# Patient Record
Sex: MALE | Race: BLACK OR AFRICAN AMERICAN | NOT HISPANIC OR LATINO | Employment: FULL TIME | URBAN - METROPOLITAN AREA
[De-identification: names, ages, dates, MRNs, and addresses within clinical notes are randomized per-mention and may not be internally consistent; named-entity substitution may affect disease eponyms.]

---

## 2020-11-10 ENCOUNTER — OFFICE VISIT (OUTPATIENT)
Dept: FAMILY MEDICINE CLINIC | Facility: CLINIC | Age: 60
End: 2020-11-10

## 2020-11-10 VITALS
BODY MASS INDEX: 42.75 KG/M2 | DIASTOLIC BLOOD PRESSURE: 90 MMHG | HEART RATE: 82 BPM | TEMPERATURE: 96.9 F | OXYGEN SATURATION: 97 % | HEIGHT: 67 IN | SYSTOLIC BLOOD PRESSURE: 172 MMHG | WEIGHT: 272.4 LBS

## 2020-11-10 DIAGNOSIS — I10 HYPERTENSION, ESSENTIAL: ICD-10-CM

## 2020-11-10 DIAGNOSIS — M51.9 LUMBAR DISC DISEASE: Primary | ICD-10-CM

## 2020-11-10 DIAGNOSIS — M1A.09X0 CHRONIC GOUT OF MULTIPLE SITES, UNSPECIFIED CAUSE: ICD-10-CM

## 2020-11-10 PROCEDURE — 99203 OFFICE O/P NEW LOW 30 MIN: CPT | Performed by: INTERNAL MEDICINE

## 2020-11-10 RX ORDER — HYDRALAZINE HYDROCHLORIDE 10 MG/1
TABLET, FILM COATED ORAL
COMMUNITY
Start: 2020-07-16 | End: 2020-11-10 | Stop reason: ALTCHOICE

## 2020-11-10 RX ORDER — HYDRALAZINE HYDROCHLORIDE 25 MG/1
25 TABLET, FILM COATED ORAL 3 TIMES DAILY
Qty: 90 TABLET | Refills: 0 | Status: SHIPPED | OUTPATIENT
Start: 2020-11-10 | End: 2021-02-15 | Stop reason: SDUPTHER

## 2020-11-10 NOTE — PROGRESS NOTES
Subjective   Neftali Rogers is a 59 y.o. male.  Patient with several issues for most this is chronic back pain does have static with this back hurts most of the time he is very restless when he tries to sleep at night also has hypertension hydralazine on his 2 mg 3 times a day 3 times a day was not sufficient he has been out for a few days of blood pressures up accordingly we will increase his hydralazine  Body mass index is 42.66 kg/m².  History of Present Illness     Back pain 30 yrs    sees pain management I am not sure or cannot tell from our conversation if he is ever truly seen neurosurgery or not he does have an MRI from 2019 which is probably several areas including a a significant disc I believe at L3-L4 should see a neurosurgeon get an updated view to see if they can offer anything lasting pain management is been released from their practice we will try to get him another pain management did offer him NSAIDs such as Zorvalex or Nalfon patient not want that declined to give him any controlled pain medicine at this point in time gout is been doing fairly well patient states he recently had lab work so we will not do updated lab work he does smoke encouraged him to wean down his smoking he is status and treatment for that did not want flu shot has not tried epidurals or radiofrequency lesioning I am in the setting Dr. Zapien who can offer both I do think he needs to please give another modality a trial I do not think patient is motivated do so but we will leave the referrals in case he has a change of mind.  Family history positive for cancer undefined kidney disease.    *P drug allergies penicillin causing swelling no surgical history.  Is a current smoker 1 pack every 3 days by his history.  No prior surgeries  Review of Systems   Musculoskeletal: Positive for back pain.   All other systems reviewed and are negative.      Objective   Vitals:    11/10/20 1239   BP: 172/90   Pulse: 82   Temp: 96.9 °F  (36.1 °C)   SpO2: 97%   Weight: 124 kg (272 lb 6.4 oz)     Physical Exam  Vitals signs and nursing note reviewed.   Constitutional:       Appearance: Normal appearance.   HENT:      Head: Normocephalic and atraumatic.   Eyes:      Conjunctiva/sclera: Conjunctivae normal.      Pupils: Pupils are equal, round, and reactive to light.   Cardiovascular:      Rate and Rhythm: Normal rate and regular rhythm.      Heart sounds: Normal heart sounds.   Pulmonary:      Effort: Pulmonary effort is normal.      Breath sounds: Normal breath sounds.   Abdominal:      General: Abdomen is flat. Bowel sounds are normal.      Palpations: Abdomen is soft.   Musculoskeletal:      Comments: Pain with palpation lower lumbar spine more lower lumbar area both midline slight to the left and right.  Significant decreased flexion getting 30 degrees forward, 5 degrees backwards, when degrees left right lateral flexion.  Does fairly well with twisting of torso somewhat restricted mobility but no significant pain with that   Skin:     General: Skin is warm and dry.   Neurological:      General: No focal deficit present.      Mental Status: He is alert.      Comments: Positive straight raise on the right at 20 degrees positive straight raise on the left at 10 degrees.         No results found for: INR    Procedures    Assessment/Plan   1.  Chronic lumbar pain plan referral to Dr. Thai bose group also referral to pain management Dr. Luis paula    2.  Hypertension uncontrolled plan hydralazine 25 g 3 times daily call blood pressure readings 2 weeks follow-up in 2 months time    3.  Gout relatively stable by history has recent labs from elsewhere we will not get updated lab work at this point in time  We did discuss NSAIDs for his back as well as prednisone Lidoderm patches patient simply wants pain medicine for that which I declined to do.  Referral for pain management should be and at this point.    Much of this encounter note is an  electronic transcription/translation of spoken language to printed text.  The electronic translation of spoken language may permit erroneous, or at times, nonsensical words or phrases to be inadvertently transcribed.  Although I have reviewed the note for such errors, some may still exist. If there are questions or for further clarification, please contact me.  There are no diagnoses linked to this encounter.

## 2020-11-12 ENCOUNTER — TELEPHONE (OUTPATIENT)
Dept: NEUROSURGERY | Facility: CLINIC | Age: 60
End: 2020-11-12

## 2020-11-12 NOTE — TELEPHONE ENCOUNTER
Tried to call patient to schedule him an new patient appointment with mid-level. Please transfer call to office if he calls back. Someone picked phone up and then hung up.

## 2021-02-15 ENCOUNTER — TELEPHONE (OUTPATIENT)
Dept: FAMILY MEDICINE CLINIC | Facility: CLINIC | Age: 61
End: 2021-02-15

## 2021-02-15 RX ORDER — HYDRALAZINE HYDROCHLORIDE 25 MG/1
25 TABLET, FILM COATED ORAL 3 TIMES DAILY
Qty: 90 TABLET | Refills: 0 | Status: SHIPPED | OUTPATIENT
Start: 2021-02-15 | End: 2021-05-26

## 2021-02-15 NOTE — TELEPHONE ENCOUNTER
Caller: Neftali Rogers Bucoda    Relationship: Self    Best call back number:[phone]    Medication needed:   Requested Prescriptions     Pending Prescriptions Disp Refills   • hydrALAZINE (APRESOLINE) 25 MG tablet 90 tablet 0     Sig: Take 1 tablet by mouth 3 (Three) Times a Day.       When do you need the refill by: 2-15-21    What details did the patient provide when requesting the medication: PATIENT IS OUT OF MEDICATION    Does the patient have less than a 3 day supply:  [x] Yes  [] No    What is the patient's preferred pharmacy: [facility] [address] & ([facility])  [phone] [facility] [phone] FX

## 2021-02-16 ENCOUNTER — TELEPHONE (OUTPATIENT)
Dept: FAMILY MEDICINE CLINIC | Facility: CLINIC | Age: 61
End: 2021-02-16

## 2021-02-16 NOTE — TELEPHONE ENCOUNTER
Have not seen patient before.  There is no problem list.  Patient should stick with his present pain management group.  Very difficult to get in pain management.  We will not write his pain medicine

## 2021-02-16 NOTE — TELEPHONE ENCOUNTER
PT LEFT MESSAGE YESTERDAY FOR PAIN MGMT APPT, WAS REFERRED TO PAIN MGMT OFFICE BACK IN November BUT WANTS ANOTHER OFFICE, STATES THEY AREN'T GIVING HIM ENOUGH MEDICATION FOR HIS PAIN.  HASN'T BEEN SEEN SINCE 11/2020 BY [facility], Epic DOESN'T SHOW HIM SEEING ANYONE ELSE HERE BEFORE

## 2021-02-17 NOTE — TELEPHONE ENCOUNTER
Called office reasor never got refferal in November not taking new patients refaxed today [phone] for patient appt.and called him with number to recheck in a week.[phone]

## 2021-04-27 ENCOUNTER — IMMUNIZATION (OUTPATIENT)
Dept: VACCINE CLINIC | Facility: HOSPITAL | Age: 61
End: 2021-04-27

## 2021-04-27 PROCEDURE — 0001A: CPT | Performed by: INTERNAL MEDICINE

## 2021-04-27 PROCEDURE — 91300 HC SARSCOV02 VAC 30MCG/0.3ML IM: CPT | Performed by: INTERNAL MEDICINE

## 2021-05-18 ENCOUNTER — IMMUNIZATION (OUTPATIENT)
Dept: VACCINE CLINIC | Facility: HOSPITAL | Age: 61
End: 2021-05-18

## 2021-05-18 PROCEDURE — 91300 HC SARSCOV02 VAC 30MCG/0.3ML IM: CPT | Performed by: INTERNAL MEDICINE

## 2021-05-18 PROCEDURE — 0002A: CPT | Performed by: INTERNAL MEDICINE

## 2021-05-26 RX ORDER — HYDRALAZINE HYDROCHLORIDE 25 MG/1
TABLET, FILM COATED ORAL
Qty: 90 TABLET | Refills: 3 | Status: SHIPPED | OUTPATIENT
Start: 2021-05-26 | End: 2021-05-27 | Stop reason: SDUPTHER

## 2021-05-27 RX ORDER — HYDRALAZINE HYDROCHLORIDE 25 MG/1
25 TABLET, FILM COATED ORAL 3 TIMES DAILY
Qty: 30 TABLET | Refills: 3 | Status: SHIPPED | OUTPATIENT
Start: 2021-05-27 | End: 2021-08-11 | Stop reason: SDUPTHER

## 2021-06-02 ENCOUNTER — OFFICE VISIT (OUTPATIENT)
Dept: FAMILY MEDICINE CLINIC | Facility: CLINIC | Age: 61
End: 2021-06-02

## 2021-06-02 ENCOUNTER — TELEPHONE (OUTPATIENT)
Dept: FAMILY MEDICINE CLINIC | Facility: CLINIC | Age: 61
End: 2021-06-02

## 2021-06-02 VITALS
SYSTOLIC BLOOD PRESSURE: 174 MMHG | WEIGHT: 265.2 LBS | DIASTOLIC BLOOD PRESSURE: 108 MMHG | OXYGEN SATURATION: 98 % | HEIGHT: 67 IN | BODY MASS INDEX: 41.62 KG/M2 | HEART RATE: 78 BPM | TEMPERATURE: 97.8 F

## 2021-06-02 DIAGNOSIS — M54.40 BILATERAL LOW BACK PAIN WITH SCIATICA, SCIATICA LATERALITY UNSPECIFIED, UNSPECIFIED CHRONICITY: Primary | ICD-10-CM

## 2021-06-02 DIAGNOSIS — Z12.5 PROSTATE CANCER SCREENING: ICD-10-CM

## 2021-06-02 DIAGNOSIS — M10.00 IDIOPATHIC GOUT, UNSPECIFIED CHRONICITY, UNSPECIFIED SITE: ICD-10-CM

## 2021-06-02 DIAGNOSIS — I10 ESSENTIAL HYPERTENSION: ICD-10-CM

## 2021-06-02 PROCEDURE — 99214 OFFICE O/P EST MOD 30 MIN: CPT | Performed by: INTERNAL MEDICINE

## 2021-06-02 RX ORDER — VERAPAMIL HYDROCHLORIDE 240 MG/1
240 TABLET, FILM COATED, EXTENDED RELEASE ORAL NIGHTLY
Qty: 30 TABLET | Refills: 3 | Status: SHIPPED | OUTPATIENT
Start: 2021-06-02 | End: 2021-07-06 | Stop reason: SDUPTHER

## 2021-06-02 RX ORDER — HYDROCODONE BITARTRATE AND ACETAMINOPHEN 5; 325 MG/1; MG/1
1 TABLET ORAL EVERY 8 HOURS PRN
Qty: 90 TABLET | Refills: 0 | Status: SHIPPED | OUTPATIENT
Start: 2021-06-02 | End: 2021-07-06 | Stop reason: SDUPTHER

## 2021-06-02 RX ORDER — INDOMETHACIN 75 MG/1
75 CAPSULE, EXTENDED RELEASE ORAL
COMMUNITY
Start: 2021-01-06 | End: 2021-11-22 | Stop reason: SDUPTHER

## 2021-06-02 NOTE — PROGRESS NOTES
Subjective   Neftali Rogers is a 60 y.o. male.     Vitals:    06/02/21 1410   BP: (Abnormal) 174/108   Pulse: 78   Temp: 97.8 °F (36.6 °C)   SpO2: 98%      Body mass index is 41.54 kg/m².     History of Present Illness   Patient was seen for low back pain.  Pain is severe pain in his lower back and he was placed on Norco 5/325 3 times daily.  Patient was informed this is in 1 month only and would not be refilled.  Patient was scheduled for the pain clinic and neurosurgeon.  Patient's blood pressure was 170/80 in the office today.  Patient was taking hydralazine 25 mg daily.  Patient will monitor blood pressure and follow-up in 1 month.  Patient was also given verapamil 240 mg daily.  Patient's gout been stable over the past several months.  Uric acid level will be drawn.  Patient had labs drawn we will follow-up in 4 days.    Dictated utilizing Dragon dictation. If there are questions or for further clarification, please contact me.  The following portions of the patient's history were reviewed and updated as appropriate: allergies, current medications, past family history, past medical history, past social history, past surgical history and problem list.    Review of Systems   Constitutional: Negative for fatigue and fever.   HENT: Positive for congestion. Negative for trouble swallowing.    Eyes: Negative for discharge and visual disturbance.   Respiratory: Negative for choking and shortness of breath.    Cardiovascular: Negative for chest pain and palpitations.   Gastrointestinal: Negative for abdominal pain and blood in stool.   Endocrine: Negative.    Genitourinary: Negative for genital sores and hematuria.   Musculoskeletal: Positive for back pain, gait problem and joint swelling.   Skin: Negative for color change, pallor, rash and wound.   Allergic/Immunologic: Positive for environmental allergies. Negative for immunocompromised state.   Neurological: Negative for facial asymmetry and speech  difficulty.   Psychiatric/Behavioral: Negative for hallucinations and suicidal ideas.       Objective   Physical Exam  Vitals and nursing note reviewed.   Constitutional:       Appearance: Normal appearance. He is well-developed.   HENT:      Head: Normocephalic and atraumatic.      Nose: Nose normal.      Mouth/Throat:      Mouth: Mucous membranes are moist.      Pharynx: Oropharynx is clear.   Eyes:      Extraocular Movements: Extraocular movements intact.      Conjunctiva/sclera: Conjunctivae normal.      Pupils: Pupils are equal, round, and reactive to light.   Cardiovascular:      Rate and Rhythm: Normal rate and regular rhythm.      Heart sounds: Normal heart sounds.   Pulmonary:      Effort: Pulmonary effort is normal.      Breath sounds: Normal breath sounds.   Abdominal:      General: Bowel sounds are normal.      Palpations: Abdomen is soft.   Musculoskeletal:         General: Swelling and tenderness present. Normal range of motion.      Cervical back: Normal range of motion and neck supple.   Skin:     General: Skin is warm and dry.   Neurological:      General: No focal deficit present.      Mental Status: He is alert and oriented to person, place, and time. Mental status is at baseline.      Coordination: Coordination abnormal.      Gait: Gait abnormal.   Psychiatric:         Mood and Affect: Mood normal.         Behavior: Behavior normal.         Thought Content: Thought content normal.         Judgment: Judgment normal.         Assessment/Plan #1 Norco 5/325 3 times daily, 1 month only #2 refer to pain clinic, neurosurgery #3 monitor blood pressure and follow-up in 1 month #4 add verapamil 240 mg daily #5 labs  Problems Addressed this Visit     Cardiac and Vasculature            Hypertension    Relevant Medications    verapamil SR (CALAN-SR) 240 MG CR tablet    Other Relevant Orders    CBC (No Diff)    Comprehensive Metabolic Panel    Lipid Panel    Vitamin D 25 Hydroxy          Musculoskeletal and  Injuries            Gout    Relevant Orders    CBC (No Diff)    Comprehensive Metabolic Panel    Lipid Panel    Vitamin D 25 Hydroxy    Uric Acid    Back pain - Primary    Relevant Medications    HYDROcodone-acetaminophen (Norco) 5-325 MG per tablet    Other Relevant Orders    CBC (No Diff)    Comprehensive Metabolic Panel    Lipid Panel    Vitamin D 25 Hydroxy    Ambulatory Referral to Pain Management            Other Visit Diagnoses     Prostate cancer screening        Relevant Orders    PSA Screen      Diagnoses     Diagnosis Codes Comments    Bilateral low back pain with sciatica, sciatica laterality unspecified, unspecified chronicity    -  Primary ICD-10-CM: M54.40  ICD-9-CM: 724.3     Idiopathic gout, unspecified chronicity, unspecified site     ICD-10-CM: M10.00  ICD-9-CM: 274.9     Essential hypertension     ICD-10-CM: I10  ICD-9-CM: 401.9     Prostate cancer screening     ICD-10-CM: Z12.5  ICD-9-CM: V76.44

## 2021-06-02 NOTE — TELEPHONE ENCOUNTER
Patient has been awaiting since November for referrals [facility] put in neurosurgeon, and pain management he said no one ever called him and now he cant sleep and barely walk . He is seeing tori today    Can some one work on this now please

## 2021-07-06 ENCOUNTER — OFFICE VISIT (OUTPATIENT)
Dept: FAMILY MEDICINE CLINIC | Facility: CLINIC | Age: 61
End: 2021-07-06

## 2021-07-06 VITALS
TEMPERATURE: 98.4 F | HEART RATE: 79 BPM | OXYGEN SATURATION: 98 % | HEIGHT: 67 IN | BODY MASS INDEX: 40.4 KG/M2 | SYSTOLIC BLOOD PRESSURE: 172 MMHG | WEIGHT: 257.4 LBS | DIASTOLIC BLOOD PRESSURE: 82 MMHG

## 2021-07-06 DIAGNOSIS — G47.33 OBSTRUCTIVE SLEEP APNEA SYNDROME: ICD-10-CM

## 2021-07-06 DIAGNOSIS — I10 ESSENTIAL HYPERTENSION: Primary | ICD-10-CM

## 2021-07-06 DIAGNOSIS — M10.00 IDIOPATHIC GOUT, UNSPECIFIED CHRONICITY, UNSPECIFIED SITE: ICD-10-CM

## 2021-07-06 DIAGNOSIS — M54.40 BILATERAL LOW BACK PAIN WITH SCIATICA, SCIATICA LATERALITY UNSPECIFIED, UNSPECIFIED CHRONICITY: ICD-10-CM

## 2021-07-06 DIAGNOSIS — M54.40 LOW BACK PAIN WITH SCIATICA, SCIATICA LATERALITY UNSPECIFIED, UNSPECIFIED BACK PAIN LATERALITY, UNSPECIFIED CHRONICITY: ICD-10-CM

## 2021-07-06 PROCEDURE — 99214 OFFICE O/P EST MOD 30 MIN: CPT | Performed by: INTERNAL MEDICINE

## 2021-07-06 RX ORDER — VERAPAMIL HYDROCHLORIDE 240 MG/1
240 TABLET, FILM COATED, EXTENDED RELEASE ORAL NIGHTLY
Qty: 30 TABLET | Refills: 3 | Status: SHIPPED | OUTPATIENT
Start: 2021-07-06 | End: 2021-08-11 | Stop reason: SDUPTHER

## 2021-07-06 RX ORDER — HYDROCODONE BITARTRATE AND ACETAMINOPHEN 5; 325 MG/1; MG/1
1 TABLET ORAL EVERY 8 HOURS PRN
Qty: 90 TABLET | Refills: 0 | Status: SHIPPED | OUTPATIENT
Start: 2021-07-06 | End: 2021-08-10

## 2021-07-06 RX ORDER — CLONIDINE HYDROCHLORIDE 0.1 MG/1
0.1 TABLET ORAL 2 TIMES DAILY
Qty: 60 TABLET | Refills: 3 | Status: SHIPPED | OUTPATIENT
Start: 2021-07-06 | End: 2021-08-11 | Stop reason: SDUPTHER

## 2021-07-06 NOTE — PROGRESS NOTES
Subjective   Neftali Rogers is a 60 y.o. male.     Vitals:    07/06/21 1311   BP: 172/82   Pulse: 79   Temp: 98.4 °F (36.9 °C)   SpO2: 98%      Body mass index is 40.31 kg/m².     History of Present Illness   Patient was seen for hypertension.  Pressures been running 170s over 80s.  Patient is using hydralazine 25 mg twice daily with verapamil 240 mg daily.  Patient is having clonidine 0.1 mg twice daily added to regimen.  Patient does have sleep apnea but had a bad experience at the sleep center and will not go back.  Patient was informed it was harder to treat hypertension with untreated sleep apnea.  Patient was also informed he could affect his heart.  Patient does suffer from severe low back pain and wanted Norco.  Patient was informed he would have to go to pain clinic after this prescription.  This would be the last 1 regimen.  Patient is being referred to a pain clinic and also MRI of the lumbar spine.  Patient does have a history of gout and is using Indocin 75 mg twice daily as needed for gout.  Patient is to have labs drawn today and uric acid is pending.    Dictated utilizing Dragon dictation. If there are questions or for further clarification, please contact me.  The following portions of the patient's history were reviewed and updated as appropriate: allergies, current medications, past family history, past medical history, past social history, past surgical history and problem list.    Review of Systems   Constitutional: Negative for fatigue and fever.   HENT: Positive for congestion. Negative for trouble swallowing.    Eyes: Negative for discharge and visual disturbance.   Respiratory: Negative for choking and shortness of breath.    Cardiovascular: Negative for chest pain and palpitations.   Gastrointestinal: Negative for abdominal pain and blood in stool.   Endocrine: Negative.    Genitourinary: Negative for genital sores and hematuria.   Musculoskeletal: Positive for back pain. Negative  for gait problem and joint swelling.   Skin: Negative for color change, pallor, rash and wound.   Allergic/Immunologic: Positive for environmental allergies. Negative for immunocompromised state.   Neurological: Negative for facial asymmetry and speech difficulty.   Psychiatric/Behavioral: Negative for hallucinations and suicidal ideas.       Objective   Physical Exam  Vitals and nursing note reviewed.   Constitutional:       Appearance: Normal appearance. He is well-developed.   HENT:      Head: Normocephalic and atraumatic.      Nose: Nose normal.      Mouth/Throat:      Mouth: Mucous membranes are moist.      Pharynx: Oropharynx is clear.   Eyes:      Extraocular Movements: Extraocular movements intact.      Conjunctiva/sclera: Conjunctivae normal.      Pupils: Pupils are equal, round, and reactive to light.   Cardiovascular:      Rate and Rhythm: Normal rate and regular rhythm.      Heart sounds: Normal heart sounds.   Pulmonary:      Effort: Pulmonary effort is normal.      Breath sounds: Normal breath sounds.   Abdominal:      General: Bowel sounds are normal.      Palpations: Abdomen is soft.   Musculoskeletal:         General: Normal range of motion.      Cervical back: Normal range of motion and neck supple.   Skin:     General: Skin is warm and dry.   Neurological:      General: No focal deficit present.      Mental Status: He is alert and oriented to person, place, and time. Mental status is at baseline.   Psychiatric:         Mood and Affect: Mood normal.         Behavior: Behavior normal.         Thought Content: Thought content normal.         Judgment: Judgment normal.         Assessment/Plan #1 clonidine 0.1 mg twice daily, continue hydralazine with verapamil No. 2 monitor blood pressure and follow-up in 1 month #3 refer to pain clinic #4 MRI of the lumbar spine  Problems Addressed this Visit     Cardiac and Vasculature            Hypertension - Primary    Relevant Medications    verapamil SR  (CALAN-SR) 240 MG CR tablet    cloNIDine (Catapres) 0.1 MG tablet          Musculoskeletal and Injuries            Gout    Back pain    Relevant Medications    HYDROcodone-acetaminophen (Norco) 5-325 MG per tablet    Other Relevant Orders    Ambulatory Referral to Pain Management    MRI Lumbar Spine Without Contrast          Sleep            Obstructive sleep apnea syndrome            Diagnoses     Diagnosis Codes Comments    Essential hypertension    -  Primary ICD-10-CM: I10  ICD-9-CM: 401.9     Idiopathic gout, unspecified chronicity, unspecified site     ICD-10-CM: M10.00  ICD-9-CM: 274.9     Low back pain with sciatica, sciatica laterality unspecified, unspecified back pain laterality, unspecified chronicity     ICD-10-CM: M54.40  ICD-9-CM: 724.3     Bilateral low back pain with sciatica, sciatica laterality unspecified, unspecified chronicity     ICD-10-CM: M54.40  ICD-9-CM: 724.3     Obstructive sleep apnea syndrome     ICD-10-CM: G47.33  ICD-9-CM: 327.23

## 2021-08-02 ENCOUNTER — TELEPHONE (OUTPATIENT)
Dept: FAMILY MEDICINE CLINIC | Facility: CLINIC | Age: 61
End: 2021-08-02

## 2021-08-06 ENCOUNTER — TELEPHONE (OUTPATIENT)
Dept: FAMILY MEDICINE CLINIC | Facility: CLINIC | Age: 61
End: 2021-08-06

## 2021-08-06 NOTE — TELEPHONE ENCOUNTER
Dr. moulton from [facility] wanted to tell you he saw patient and he had no issuses and he his reviewing patient records and gave me his cell so you could discuss patient [phone] visit.          Patient had attempted to see several other pain managements and at one failed screening.

## 2021-08-08 NOTE — TELEPHONE ENCOUNTER
Patient needs to be informed we will not refill his hydrocodone. If he is shopping other doctors for pain medication will need to be fired.

## 2021-08-11 ENCOUNTER — OFFICE VISIT (OUTPATIENT)
Dept: FAMILY MEDICINE CLINIC | Facility: CLINIC | Age: 61
End: 2021-08-11

## 2021-08-11 VITALS
HEART RATE: 70 BPM | HEIGHT: 67 IN | BODY MASS INDEX: 40.18 KG/M2 | SYSTOLIC BLOOD PRESSURE: 122 MMHG | WEIGHT: 256 LBS | RESPIRATION RATE: 16 BRPM | OXYGEN SATURATION: 98 % | DIASTOLIC BLOOD PRESSURE: 80 MMHG | TEMPERATURE: 97.8 F

## 2021-08-11 DIAGNOSIS — M10.00 IDIOPATHIC GOUT, UNSPECIFIED CHRONICITY, UNSPECIFIED SITE: ICD-10-CM

## 2021-08-11 DIAGNOSIS — M54.40 CHRONIC LOW BACK PAIN WITH SCIATICA, SCIATICA LATERALITY UNSPECIFIED, UNSPECIFIED BACK PAIN LATERALITY: Primary | ICD-10-CM

## 2021-08-11 DIAGNOSIS — G89.29 CHRONIC LOW BACK PAIN WITH SCIATICA, SCIATICA LATERALITY UNSPECIFIED, UNSPECIFIED BACK PAIN LATERALITY: Primary | ICD-10-CM

## 2021-08-11 DIAGNOSIS — M48.061 SPINAL STENOSIS AT L4-L5 LEVEL: ICD-10-CM

## 2021-08-11 DIAGNOSIS — I10 ESSENTIAL HYPERTENSION: ICD-10-CM

## 2021-08-11 PROBLEM — M54.50 CHRONIC LOW BACK PAIN: Status: ACTIVE | Noted: 2021-08-11

## 2021-08-11 PROCEDURE — 99214 OFFICE O/P EST MOD 30 MIN: CPT | Performed by: INTERNAL MEDICINE

## 2021-08-11 RX ORDER — HYDRALAZINE HYDROCHLORIDE 25 MG/1
25 TABLET, FILM COATED ORAL 3 TIMES DAILY
Qty: 90 TABLET | Refills: 3 | Status: SHIPPED | OUTPATIENT
Start: 2021-08-11 | End: 2021-12-28 | Stop reason: SDUPTHER

## 2021-08-11 RX ORDER — HYDROCODONE BITARTRATE AND ACETAMINOPHEN 10; 325 MG/1; MG/1
1 TABLET ORAL EVERY 8 HOURS PRN
Qty: 20 TABLET | Refills: 0 | OUTPATIENT
Start: 2021-08-11 | End: 2023-01-18

## 2021-08-11 RX ORDER — VERAPAMIL HYDROCHLORIDE 240 MG/1
240 TABLET, FILM COATED, EXTENDED RELEASE ORAL NIGHTLY
Qty: 90 TABLET | Refills: 3 | Status: SHIPPED | OUTPATIENT
Start: 2021-08-11 | End: 2021-12-28 | Stop reason: SDUPTHER

## 2021-08-11 RX ORDER — CLONIDINE HYDROCHLORIDE 0.1 MG/1
0.1 TABLET ORAL 2 TIMES DAILY
Qty: 180 TABLET | Refills: 3 | Status: SHIPPED | OUTPATIENT
Start: 2021-08-11 | End: 2021-12-28 | Stop reason: SDUPTHER

## 2021-08-11 NOTE — PROGRESS NOTES
Subjective   Neftali Rogers is a 60 y.o. male.     Vitals:    08/11/21 1335   BP: 122/80   Pulse: 70   Resp: 16   Temp: 97.8 °F (36.6 °C)   SpO2: 98%      Body mass index is 40.09 kg/m².     History of Present Illness   Patient was seen for low back pain.  Patient had an MRI on him in hospital.  Patient showed severe arthritis along the lumbar spine and spinal stenosis at L4-5.  Patient also had radiculopathy down the L5 nerve root.  Patient was referred to a orthopedic spine specialist and a pain clinic.  Patient was given 20 hydrocodone 10/325 and told it would not be renewed.  Patient will follow up in 1 month.  Patient's blood pressures been running 180s patient will continue verapamil  mg daily, hydralazine 25 mg 3 times daily, clonidine 0.1 mg twice daily.  Patient also has a history of gout.  Patient is using a indomethacin 75 mg daily as needed.    Dictated utilizing Dragon dictation. If there are questions or for further clarification, please contact me.  The following portions of the patient's history were reviewed and updated as appropriate: allergies, current medications, past family history, past medical history, past social history, past surgical history and problem list.    Review of Systems   Constitutional: Negative for fatigue and fever.   HENT: Positive for congestion. Negative for trouble swallowing.    Eyes: Negative for discharge and visual disturbance.   Respiratory: Negative for choking and shortness of breath.    Cardiovascular: Negative for chest pain and palpitations.   Gastrointestinal: Negative for abdominal pain and blood in stool.   Endocrine: Negative.    Genitourinary: Negative for genital sores and hematuria.   Musculoskeletal: Positive for back pain and gait problem. Negative for joint swelling.   Skin: Negative for color change, pallor, rash and wound.   Allergic/Immunologic: Positive for environmental allergies. Negative for immunocompromised state.   Neurological:  Negative for facial asymmetry and speech difficulty.   Psychiatric/Behavioral: Negative for hallucinations and suicidal ideas.       Objective   Physical Exam  Vitals and nursing note reviewed.   Constitutional:       Appearance: Normal appearance. He is well-developed.   HENT:      Head: Normocephalic and atraumatic.      Nose: Nose normal.      Mouth/Throat:      Mouth: Mucous membranes are moist.      Pharynx: Oropharynx is clear.   Eyes:      Extraocular Movements: Extraocular movements intact.      Conjunctiva/sclera: Conjunctivae normal.      Pupils: Pupils are equal, round, and reactive to light.   Cardiovascular:      Rate and Rhythm: Normal rate and regular rhythm.      Heart sounds: Normal heart sounds. No murmur heard.   No friction rub. No gallop.    Pulmonary:      Effort: Pulmonary effort is normal. No respiratory distress.      Breath sounds: Normal breath sounds. No stridor. No wheezing, rhonchi or rales.   Chest:      Chest wall: No tenderness.   Abdominal:      General: Bowel sounds are normal.      Palpations: Abdomen is soft.   Musculoskeletal:         General: Swelling present. Normal range of motion.      Cervical back: Normal range of motion and neck supple.   Skin:     General: Skin is warm and dry.   Neurological:      General: No focal deficit present.      Mental Status: He is alert and oriented to person, place, and time. Mental status is at baseline.   Psychiatric:         Mood and Affect: Mood normal.         Behavior: Behavior normal.         Thought Content: Thought content normal.         Judgment: Judgment normal.         Assessment/Plan #1 refer to pain clinic to referred to orthopedic back surgeon #3 indomethacin SR 75 mg daily as needed for gout #4 continue to monitor blood pressure  Problems Addressed this Visit     Cardiac and Vasculature            Hypertension    Relevant Medications    cloNIDine (Catapres) 0.1 MG tablet    hydrALAZINE (APRESOLINE) 25 MG tablet    verapamil  SR (CALAN-SR) 240 MG CR tablet          Musculoskeletal and Injuries            Gout    Chronic low back pain - Primary            Other Visit Diagnoses     Spinal stenosis at L4-L5 level        Relevant Medications    HYDROcodone-acetaminophen (NORCO)  MG per tablet    Other Relevant Orders    Ambulatory Referral to Orthopedic Surgery    Ambulatory Referral to Pain Management    Back Brace      Diagnoses     Diagnosis Codes Comments    Chronic low back pain with sciatica, sciatica laterality unspecified, unspecified back pain laterality    -  Primary ICD-10-CM: M54.40, G89.29  ICD-9-CM: 724.2, 724.3, 338.29     Essential hypertension     ICD-10-CM: I10  ICD-9-CM: 401.9     Idiopathic gout, unspecified chronicity, unspecified site     ICD-10-CM: M10.00  ICD-9-CM: 274.9     Spinal stenosis at L4-L5 level     ICD-10-CM: M48.061  ICD-9-CM: 724.02

## 2021-09-01 NOTE — PROGRESS NOTES
Subjective   History of Present Illness: Neftali Rogers is a 60 y.o. male is being seen for consultation today at the request of Neftali Urena MD for constant back pain that radiates into the left leg with numbness and tingling that began approximately the year 2000 with an episode on a staircase.  He states that over the last 20 years the symptoms have continued to progress although she required fairly advanced evaluation and treatment in the past.  He denies bladder/bowel incontinence. He has tried oral pain medication, physical therapy, and chiropractic.  He says he has seen pain management in the past someone over on [facility] that change name several times and was recommended injections which he has refused in the past.  He is not interested in any surgical options either.  He was under the assumption that we were a pain management clinic.    While in the room and during my examination of the patient I wore a mask and eye protection.  I washed my hands before and after this patient encounter.  The patient was also wearing a mask.    Back Pain  The problem occurs constantly. The problem has been gradually worsening since onset. The pain is present in the lumbar spine. The quality of the pain is described as burning, aching, shooting, cramping and stabbing. The pain radiates to the left knee, left thigh and left foot. The pain is moderate. The symptoms are aggravated by position. Associated symptoms include numbness and tingling. Pertinent negatives include no bladder incontinence, bowel incontinence, chest pain or weakness. Treatments tried: oral pain medication.       The following portions of the patient's history were reviewed and updated as appropriate: allergies, current medications, past family history, past medical history, past social history, past surgical history and problem list.    Review of Systems   Constitutional: Positive for activity change.   HENT: Negative for congestion.   "  Eyes: Negative for visual disturbance.   Respiratory: Negative for chest tightness and shortness of breath.    Cardiovascular: Negative for chest pain.   Gastrointestinal: Negative for bowel incontinence and nausea.   Endocrine: Negative for cold intolerance and heat intolerance.   Genitourinary: Negative for bladder incontinence and difficulty urinating.   Musculoskeletal: Positive for back pain.   Skin: Negative for rash.   Allergic/Immunologic: Negative for environmental allergies.   Neurological: Positive for tingling and numbness. Negative for weakness.   Hematological: Does not bruise/bleed easily.   Psychiatric/Behavioral: Positive for sleep disturbance.       Objective     Vitals:    09/07/21 0947   BP: (!) 180/112   Temp: 97.8 °F (36.6 °C)   Weight: 116 kg (256 lb)   Height: 170.2 cm (67.01\")     Body mass index is 40.08 kg/m².      Physical Exam  Neurologic Exam    Physical Exam:    CONSTITUTIONAL: This 60 year old right handed Black male appears well developed, well-nourished and is sweating and uncomfortable.    HEAD & FACE: the head and face are symmetric, normocephalic and atraumatic.    EYES: Inspection of the conjunctivae and lids reveals no swelling, erythema or discharge.  Pupils are round, equal and reactive to light and there is no scleral icterus.    EARS, NOSE, MOUTH & THROAT: On inspection, the ears and nose are within normal limits.    NECK: the neck is supple and symmetric. The trachea is midline with no masses.    PULMONARY: Respiratory effort is normal with no increased work of breathing or signs of respiratory distress.    CARDIOVASCULAR: Pedal pulses are +2/4 bilaterally. Examination of the extremities shows no edema or varicosities.    LYMPHATIC: There is no palpable lymphadenopathy of the neck.    MUSCULOSKELETAL: Gait and station reveal a lumbago bent over posture. The spine is nontender to palpation but he has pain to palpation over the left SI joint    SKIN: The skin is warm, dry " and intact    NEUROLOGIC:   Cranial Nerves 2-12 intact  Normal motor strength noted. Muscle bulk and tone are normal.  Sensory exam is normal to fine touch to confrontational testing bilaterally  Reflexes on the right side demonstrates 1/4 Knee Jerk Reflex, 0/4 Ankle Jerk Reflex and no ankle clonus on the right.   Reflexes on the left side demonstrates 1/4 Knee Jerk Reflex, 0/4 Ankle Jerk Reflex and no ankle clonus on the left.  Superficial/Primitive Reflexes: primitive reflexes were absent.  Rich's, Babinski, and Clonus signs all negative.  No coordination deficit observed.  Radicular testing showed a negative Owen (ALEXY) test and positive straight leg raise.    Cortical function is intact and without deficits. Speech is normal.    PSYCHIATRIC: oriented to person, place and time. Patient's mood and affect are normal.    Assessment/Plan   Independent Review of Radiographic Studies:      I personally reviewed the images from the following studies.    MRI of the lumbar spine done at [facility] on August 5, 2021 reveals disc degeneration predominantly at L5-S1 which appears to be progressive since previous studies.  There is bony neuroforaminal stenosis at L5-S1.  At L4-5 there is advanced degenerative change with degenerative spondylolisthesis.  There is mild canal and bilateral lateral recess stenosis secondary to the deformity.  At L3-4 there is prominent facet arthropathy to the left but no neuroforaminal stenosis.    Medical Decision Making:      Patient has chronic pain in the low back to the left side into the proximal left leg.  He denies any weakness or numbness in the leg.  Clinical exam does not identify specific radiculopathy although he has radicular symptoms.  The findings on the MRI if he proceeds with an operative intervention it would require a reconstruction given the deformity which is outside of my clinical practice and I have deferred over the last several years to the spinal  subspecialist.  I offered referring him to an orthopedic spine specialist or neurosurgical spine subspecialist and he is not interested in pursuing surgery.  He thought he was being referred to me to be prescribed medication.  I explained that were surgical practice making surgical evaluations but I can refer him to an expert pain management specialist to offer them the nonoperative treatments.  He is also not inclined to proceed with interventional pain management either but I encouraged him to strongly consider it while he awaits that appointment.    This is a difficult situation as research suggests people with a BMI of greater than 40 have a higher than average rate of failed back surgery, and this patient's BMI is Body mass index is 40.08 kg/m².. With the limitations based on the BMI, surgery could only be offered in a situation where life or limb is threatened. We recommend an aggressive weight loss plan with the PCP.    Should he require a surgical opinion due to progression or failure of pain management or change his mind about the option of surgery he should see a spinal subspecialist such as an orthopedic spine doctor or a neurosurgical fellowship-trained subspecialist in spine.    No follow-ups on file.    Diagnoses and all orders for this visit:    1. Chronic left-sided low back pain with left-sided sciatica (Primary)  -     Ambulatory Referral to Pain Management    2. Spondylolisthesis of lumbar region  -     Ambulatory Referral to Pain Management    3. Morbid (severe) obesity due to excess calories (CMS/[facility])  -     Ambulatory Referral to Pain Management             Chava Rocha MD FACS FAANS  Neurological Surgery

## 2021-09-07 ENCOUNTER — OFFICE VISIT (OUTPATIENT)
Dept: NEUROSURGERY | Facility: CLINIC | Age: 61
End: 2021-09-07

## 2021-09-07 VITALS
WEIGHT: 256 LBS | BODY MASS INDEX: 40.18 KG/M2 | HEIGHT: 67 IN | DIASTOLIC BLOOD PRESSURE: 112 MMHG | SYSTOLIC BLOOD PRESSURE: 180 MMHG | TEMPERATURE: 97.8 F

## 2021-09-07 DIAGNOSIS — E66.01 MORBID (SEVERE) OBESITY DUE TO EXCESS CALORIES (HCC): ICD-10-CM

## 2021-09-07 DIAGNOSIS — M43.16 SPONDYLOLISTHESIS OF LUMBAR REGION: ICD-10-CM

## 2021-09-07 DIAGNOSIS — M54.42 CHRONIC LEFT-SIDED LOW BACK PAIN WITH LEFT-SIDED SCIATICA: Primary | ICD-10-CM

## 2021-09-07 DIAGNOSIS — G89.29 CHRONIC LEFT-SIDED LOW BACK PAIN WITH LEFT-SIDED SCIATICA: Primary | ICD-10-CM

## 2021-09-07 PROCEDURE — 99204 OFFICE O/P NEW MOD 45 MIN: CPT | Performed by: NEUROLOGICAL SURGERY

## 2021-09-09 ENCOUNTER — PATIENT ROUNDING (BHMG ONLY) (OUTPATIENT)
Dept: NEUROSURGERY | Facility: CLINIC | Age: 61
End: 2021-09-09

## 2021-09-09 NOTE — PROGRESS NOTES
September 9, 2021    Hello, may I speak with Neftali Rogers?    My name is Jackelyn Lilin, Clinical Coordinator       I am  with [facility] NEUROSURGERY [facility] NEUROSURGERY  [address]  [place] [address] [phone].    Before we get started may I verify your date of birth? 1960    I am calling to officially welcome you to our practice and ask about your recent visit. Is this a good time to talk? yes    Tell me about your visit with us. What things went well?  Patient stated that he was sent to see Dr. Rocha by his PCP and he didn't want a surgery.  Dr. Rocha stated that he needed surgery.  Patient stated that he was nice but just felt like the visit was a waste of his time because he made it very clear to his PCP that he did not want surgery.         We're always looking for ways to make our patients' experiences even better. Do you have recommendations on ways we may improve?  no    Overall were you satisfied with your first visit to our practice? Yes, just wish that his PCP would have just sent him to pain management instead of a neurosurgeon.  Patient did say that he is getting set up with pain management.        I appreciate you taking the time to speak with me today. Is there anything else I can do for you? no      Thank you, and have a great day.

## 2021-11-22 RX ORDER — INDOMETHACIN 75 MG/1
75 CAPSULE, EXTENDED RELEASE ORAL 2 TIMES DAILY WITH MEALS
Qty: 20 CAPSULE | Refills: 0 | Status: SHIPPED | OUTPATIENT
Start: 2021-11-22 | End: 2021-12-28 | Stop reason: SDUPTHER

## 2021-11-22 RX ORDER — INDOMETHACIN 75 MG/1
75 CAPSULE, EXTENDED RELEASE ORAL 2 TIMES DAILY WITH MEALS
Qty: 20 CAPSULE | Refills: 0 | Status: SHIPPED | OUTPATIENT
Start: 2021-11-22 | End: 2021-11-22 | Stop reason: SDUPTHER

## 2021-11-22 NOTE — TELEPHONE ENCOUNTER
Caller: Neftali Rogers Montchanin    Relationship: Self    Best call back number: [phone]     Requested Prescriptions:   Requested Prescriptions     Pending Prescriptions Disp Refills   • indomethacin SR (INDOCIN SR) 75 MG CR capsule 20 capsule 0     Sig: Take 1 capsule by mouth 2 (Two) Times a Day With Meals.        [address] where request should be sent[address] [facility] & ([address])  [phone] [facility] [phone] FX     Does the patient have less than a 3 day supply:  [x] Yes  [] No    Win Louise Rep   11/22/21 14:09 EST

## 2021-11-22 NOTE — TELEPHONE ENCOUNTER
Over due for labs, needs labs before next refill. Already ordered per RLS but needs to come in for lab appt.

## 2021-12-28 ENCOUNTER — TELEPHONE (OUTPATIENT)
Dept: FAMILY MEDICINE CLINIC | Facility: CLINIC | Age: 61
End: 2021-12-28

## 2021-12-28 RX ORDER — CLONIDINE HYDROCHLORIDE 0.1 MG/1
0.1 TABLET ORAL 2 TIMES DAILY
Qty: 180 TABLET | Refills: 3 | Status: SHIPPED | OUTPATIENT
Start: 2021-12-28 | End: 2022-03-28 | Stop reason: SDUPTHER

## 2021-12-28 RX ORDER — INDOMETHACIN 75 MG/1
75 CAPSULE, EXTENDED RELEASE ORAL 2 TIMES DAILY WITH MEALS
Qty: 20 CAPSULE | Refills: 1 | Status: SHIPPED | OUTPATIENT
Start: 2021-12-28 | End: 2022-03-28 | Stop reason: SDUPTHER

## 2021-12-28 RX ORDER — HYDRALAZINE HYDROCHLORIDE 25 MG/1
25 TABLET, FILM COATED ORAL 3 TIMES DAILY
Qty: 90 TABLET | Refills: 3 | Status: SHIPPED | OUTPATIENT
Start: 2021-12-28 | End: 2022-03-28 | Stop reason: SDUPTHER

## 2021-12-28 RX ORDER — VERAPAMIL HYDROCHLORIDE 240 MG/1
240 TABLET, FILM COATED, EXTENDED RELEASE ORAL NIGHTLY
Qty: 90 TABLET | Refills: 3 | Status: SHIPPED | OUTPATIENT
Start: 2021-12-28 | End: 2022-03-28 | Stop reason: SDUPTHER

## 2021-12-28 NOTE — TELEPHONE ENCOUNTER
PATIENT CALLED TO GET REFILLS OF BOTH BLOOD PRESSURE MEDICATIONS AND THE GOUT MEDICATION. DOESN'T KNOW THEIR NAMES. WANTED THE GOUT MEDICATION TO BE MORE THAN 20 SINCE TAKE IT 2 TIMES A DAY AND ONLY LAST FOR 10 DAYS    [facility] [address] AT [facility] & ([address]) - [phone]  - [phone] FX

## 2022-03-28 ENCOUNTER — OFFICE VISIT (OUTPATIENT)
Dept: FAMILY MEDICINE CLINIC | Facility: CLINIC | Age: 62
End: 2022-03-28

## 2022-03-28 VITALS
WEIGHT: 247.6 LBS | TEMPERATURE: 97.3 F | BODY MASS INDEX: 38.86 KG/M2 | OXYGEN SATURATION: 98 % | HEIGHT: 67 IN | HEART RATE: 76 BPM | SYSTOLIC BLOOD PRESSURE: 148 MMHG | DIASTOLIC BLOOD PRESSURE: 90 MMHG

## 2022-03-28 DIAGNOSIS — M10.00 IDIOPATHIC GOUT, UNSPECIFIED CHRONICITY, UNSPECIFIED SITE: ICD-10-CM

## 2022-03-28 DIAGNOSIS — M54.42 CHRONIC LEFT-SIDED LOW BACK PAIN WITH LEFT-SIDED SCIATICA: ICD-10-CM

## 2022-03-28 DIAGNOSIS — I10 PRIMARY HYPERTENSION: Primary | ICD-10-CM

## 2022-03-28 DIAGNOSIS — E66.01 MORBID (SEVERE) OBESITY DUE TO EXCESS CALORIES: ICD-10-CM

## 2022-03-28 DIAGNOSIS — J18.9 PNEUMONIA DUE TO INFECTIOUS ORGANISM, UNSPECIFIED LATERALITY, UNSPECIFIED PART OF LUNG: ICD-10-CM

## 2022-03-28 DIAGNOSIS — G89.29 CHRONIC LEFT-SIDED LOW BACK PAIN WITH LEFT-SIDED SCIATICA: ICD-10-CM

## 2022-03-28 DIAGNOSIS — G47.33 OBSTRUCTIVE SLEEP APNEA SYNDROME: ICD-10-CM

## 2022-03-28 PROCEDURE — 99214 OFFICE O/P EST MOD 30 MIN: CPT | Performed by: NURSE PRACTITIONER

## 2022-03-28 RX ORDER — INDOMETHACIN 75 MG/1
75 CAPSULE, EXTENDED RELEASE ORAL 2 TIMES DAILY WITH MEALS
Qty: 20 CAPSULE | Refills: 1 | Status: SHIPPED | OUTPATIENT
Start: 2022-03-28 | End: 2023-02-09 | Stop reason: SDUPTHER

## 2022-03-28 RX ORDER — METRONIDAZOLE 500 MG/1
500 TABLET ORAL 3 TIMES DAILY
COMMUNITY
Start: 2022-03-07 | End: 2022-04-02

## 2022-03-28 RX ORDER — HYDRALAZINE HYDROCHLORIDE 25 MG/1
25 TABLET, FILM COATED ORAL 3 TIMES DAILY
Qty: 90 TABLET | Refills: 3 | Status: SHIPPED | OUTPATIENT
Start: 2022-03-28 | End: 2023-02-09 | Stop reason: SDUPTHER

## 2022-03-28 RX ORDER — CLONIDINE HYDROCHLORIDE 0.1 MG/1
0.1 TABLET ORAL 2 TIMES DAILY
Qty: 180 TABLET | Refills: 3 | Status: SHIPPED | OUTPATIENT
Start: 2022-03-28

## 2022-03-28 RX ORDER — VERAPAMIL HYDROCHLORIDE 240 MG/1
240 TABLET, FILM COATED, EXTENDED RELEASE ORAL NIGHTLY
Qty: 90 TABLET | Refills: 3 | Status: SHIPPED | OUTPATIENT
Start: 2022-03-28

## 2022-03-28 RX ORDER — LEVOFLOXACIN 750 MG/1
750 TABLET ORAL DAILY
COMMUNITY
Start: 2022-03-07 | End: 2022-04-02

## 2022-03-28 NOTE — PROGRESS NOTES
"Chief Complaint  Hospital Follow Up Visit and Pneumonia    Subjective          Neftali Rogers presents to [facility]  Patient presents to the office for a hospital follow admission.3/5/2022/3/7/2022 Patient went to ER for dyspnea, increased cough and congestion. Patient was admitted for PNA. Patient is still taking antibiotics. Patient is to follow up with pulmonologist. Patient reports improvement with s/s   Patient has chronic pain and is requesting refill on Norco.       Objective   Vital Signs:   /90 (BP Location: Left arm, Patient Position: Sitting, Cuff Size: Large Adult)   Pulse 76   Temp 97.3 °F (36.3 °C) (Infrared)   Ht 170.2 cm (67.01\")   Wt 112 kg (247 lb 9.6 oz)   SpO2 98%   BMI 38.77 kg/m²     BMI is above normal parameters. Recommendations: educational material discussed/shared in after visit summary, exercise counseling/recommendations and nutrition counseling/recommendations       Physical Exam  Constitutional:       General: He is not in acute distress.     Appearance: Normal appearance.   HENT:      Head: Normocephalic.   Eyes:      Pupils: Pupils are equal, round, and reactive to light.   Cardiovascular:      Rate and Rhythm: Normal rate and regular rhythm.      Pulses: Normal pulses.      Heart sounds: Normal heart sounds.   Pulmonary:      Effort: Pulmonary effort is normal. No respiratory distress.      Breath sounds: Normal breath sounds. No wheezing or rales.   Abdominal:      General: Abdomen is flat.      Palpations: Abdomen is soft. There is no mass.      Tenderness: There is no abdominal tenderness.      Hernia: No hernia is present.   Musculoskeletal:         General: Tenderness present.      Cervical back: Normal. No spasms or tenderness. Normal range of motion.      Thoracic back: Normal. No spasms or tenderness. Normal range of motion.      Lumbar back: Spasms and tenderness present. No edema or bony tenderness. Decreased range of " motion. No scoliosis.   Neurological:      Mental Status: He is alert.   Psychiatric:         Mood and Affect: Mood normal.         Behavior: Behavior normal.        Result Review :   The following data was reviewed by: FERDINAND Guzmán on 03/28/2022:  Common labs    Common Labsle 3/6/22 3/7/22   WBC 14.14 (A) 10.94   Hemoglobin 13.6 14.1   Hematocrit 43.2 43.5   Platelets 264 270   (A) Abnormal value                     Assessment and Plan    Diagnoses and all orders for this visit:    1. Primary hypertension (Primary)  Assessment & Plan:  Hypertension is improving with treatment.  Continue current treatment regimen.  Dietary sodium restriction.  Weight loss.  Regular aerobic exercise.  Continue current medications.  Blood pressure will be reassessed at the next regular appointment.    Orders:  -     cloNIDine (Catapres) 0.1 MG tablet; Take 1 tablet by mouth 2 (Two) Times a Day.  Dispense: 180 tablet; Refill: 3  -     hydrALAZINE (APRESOLINE) 25 MG tablet; Take 1 tablet by mouth 3 (Three) Times a Day.  Dispense: 90 tablet; Refill: 3  -     verapamil SR (CALAN-SR) 240 MG CR tablet; Take 1 tablet by mouth Every Night.  Dispense: 90 tablet; Refill: 3  -     indomethacin SR (INDOCIN SR) 75 MG CR capsule; Take 1 capsule by mouth 2 (Two) Times a Day With Meals.  Dispense: 20 capsule; Refill: 1    2. Morbid (severe) obesity due to excess calories (HCC)  Assessment & Plan:  Patient's (Body mass index is 38.77 kg/m².) indicates that they are obese (BMI >30) with health conditions that include obstructive sleep apnea, hypertension and dyslipidemias . Weight is improving with treatment. BMI is is above average; BMI management plan is completed. We discussed portion control and increasing exercise.       3. Chronic left-sided low back pain with left-sided sciatica  Assessment & Plan:  Pt takes ibuprofen wants to start norco I advised pain management he refused at this time       4. Idiopathic gout, unspecified  chronicity, unspecified site  Assessment & Plan:  Stable takes indomethacin PRN       5. Obstructive sleep apnea syndrome  Assessment & Plan:  Pt to fu with pulmonary       6. Pneumonia due to infectious organism, unspecified laterality, unspecified part of lung  Assessment & Plan:  Taking levaquin/flagyl   Reports improvement         Follow Up   No follow-ups on file.  Patient was given instructions and counseling regarding his condition or for health maintenance advice. Please see specific information pulled into the AVS if appropriate.

## 2022-03-28 NOTE — ASSESSMENT & PLAN NOTE
Patient's (Body mass index is 38.77 kg/m².) indicates that they are obese (BMI >30) with health conditions that include obstructive sleep apnea, hypertension and dyslipidemias . Weight is improving with treatment. BMI is is above average; BMI management plan is completed. We discussed portion control and increasing exercise.

## 2022-07-12 NOTE — TELEPHONE ENCOUNTER
Patient was informed #or [phone] said he was at work and couldn't call.  Told him would send to [employer] to reschedule   unk

## 2023-01-18 ENCOUNTER — TELEPHONE (OUTPATIENT)
Dept: FAMILY MEDICINE CLINIC | Facility: CLINIC | Age: 63
End: 2023-01-18
Payer: COMMERCIAL

## 2023-02-09 ENCOUNTER — DOCUMENTATION (OUTPATIENT)
Dept: FAMILY MEDICINE CLINIC | Facility: CLINIC | Age: 63
End: 2023-02-09

## 2023-02-09 ENCOUNTER — OFFICE VISIT (OUTPATIENT)
Dept: FAMILY MEDICINE CLINIC | Facility: CLINIC | Age: 63
End: 2023-02-09
Payer: COMMERCIAL

## 2023-02-09 VITALS
BODY MASS INDEX: 36.16 KG/M2 | HEART RATE: 88 BPM | HEIGHT: 67 IN | TEMPERATURE: 97.3 F | OXYGEN SATURATION: 97 % | WEIGHT: 230.4 LBS | DIASTOLIC BLOOD PRESSURE: 88 MMHG | SYSTOLIC BLOOD PRESSURE: 158 MMHG

## 2023-02-09 DIAGNOSIS — Z12.5 PROSTATE CANCER SCREENING: ICD-10-CM

## 2023-02-09 DIAGNOSIS — M10.00 IDIOPATHIC GOUT, UNSPECIFIED CHRONICITY, UNSPECIFIED SITE: ICD-10-CM

## 2023-02-09 DIAGNOSIS — M54.42 CHRONIC LEFT-SIDED LOW BACK PAIN WITH LEFT-SIDED SCIATICA: Primary | ICD-10-CM

## 2023-02-09 DIAGNOSIS — I10 PRIMARY HYPERTENSION: ICD-10-CM

## 2023-02-09 DIAGNOSIS — G89.29 CHRONIC LEFT-SIDED LOW BACK PAIN WITH LEFT-SIDED SCIATICA: Primary | ICD-10-CM

## 2023-02-09 PROCEDURE — 99214 OFFICE O/P EST MOD 30 MIN: CPT | Performed by: INTERNAL MEDICINE

## 2023-02-09 RX ORDER — HYDROCODONE BITARTRATE AND ACETAMINOPHEN 10; 325 MG/1; MG/1
1 TABLET ORAL EVERY 8 HOURS PRN
Qty: 30 TABLET | Refills: 0 | Status: SHIPPED | OUTPATIENT
Start: 2023-02-09

## 2023-02-09 RX ORDER — HYDRALAZINE HYDROCHLORIDE 25 MG/1
25 TABLET, FILM COATED ORAL 3 TIMES DAILY
Qty: 90 TABLET | Refills: 3 | Status: SHIPPED | OUTPATIENT
Start: 2023-02-09

## 2023-02-09 RX ORDER — INDOMETHACIN 75 MG/1
75 CAPSULE, EXTENDED RELEASE ORAL 2 TIMES DAILY WITH MEALS
Qty: 20 CAPSULE | Refills: 3 | Status: SHIPPED | OUTPATIENT
Start: 2023-02-09

## 2023-02-09 RX ORDER — SILDENAFIL 100 MG/1
100 TABLET, FILM COATED ORAL DAILY PRN
Qty: 10 TABLET | Refills: 3 | Status: SHIPPED | OUTPATIENT
Start: 2023-02-09 | End: 2023-03-20 | Stop reason: SDUPTHER

## 2023-02-09 NOTE — PROGRESS NOTES
"Chief Complaint  personal issue and Hypertension    Subjective        Neftali Rogers presents to [facility]  History of Present Illness patient was seen for severe low back pain.  MRI shows extensive spondylolisthesis spinal stenosis.  Patient was given 10 days worth of hydrocodone 10/325 and was told it would not be refilled.  Patient agreed to see a back specialist but did not want a pain clinic.  Patient was again informed that we would not keep prescribing him hydrocodone.  Patient blood pressure today was 150/80.  Patient did not take his medicine this morning.  Blood pressure normally is in the 130 systolic.  Patient does take verapamil  mg daily, hydralazine 25 mg 3 times daily, clonidine 0.1 mg twice daily.  Patient does have a history of gout and takes indomethacin 75 mg SR as needed.  Patient is having labs today.    Dictated utilizing Dragon dictation. If there are questions or for further clarification, please contact me.    Objective   Vital Signs:  Blood Pressure 158/88   Pulse 88   Temperature 97.3 °F (36.3 °C)   Height 170.2 cm (67\")   Weight 105 kg (230 lb 6.4 oz)   Oxygen Saturation 97%   Body Mass Index 36.09 kg/m²   Estimated body mass index is 36.09 kg/m² as calculated from the following:    Height as of this encounter: 170.2 cm (67\").    Weight as of this encounter: 105 kg (230 lb 6.4 oz).             Physical Exam  Vitals and nursing note reviewed.   Constitutional:       Appearance: Normal appearance. He is well-developed.   HENT:      Head: Normocephalic and atraumatic.      Nose: Nose normal.      Mouth/Throat:      Mouth: Mucous membranes are moist.      Pharynx: Oropharynx is clear.   Eyes:      Extraocular Movements: Extraocular movements intact.      Conjunctiva/sclera: Conjunctivae normal.      Pupils: Pupils are equal, round, and reactive to light.   Cardiovascular:      Rate and Rhythm: Normal rate and regular rhythm.      Heart " sounds: Normal heart sounds. No murmur heard.    No friction rub. No gallop.   Pulmonary:      Effort: Pulmonary effort is normal. No respiratory distress.      Breath sounds: Normal breath sounds. No stridor. No wheezing, rhonchi or rales.   Chest:      Chest wall: No tenderness.   Abdominal:      General: Bowel sounds are normal.      Palpations: Abdomen is soft.   Musculoskeletal:         General: Swelling, tenderness and deformity present. Normal range of motion.      Cervical back: Normal range of motion and neck supple.   Skin:     General: Skin is warm and dry.   Neurological:      General: No focal deficit present.      Mental Status: He is alert and oriented to person, place, and time. Mental status is at baseline.      Gait: Gait abnormal.   Psychiatric:         Mood and Affect: Mood normal.         Behavior: Behavior normal.         Thought Content: Thought content normal.         Judgment: Judgment normal.        Result Review :                   Assessment and Plan  #1 refer to neurosurgeon #2 Labs # 3  10 days of hydrocodone  Diagnoses and all orders for this visit:    1. Chronic left-sided low back pain with left-sided sciatica (Primary)  -     HYDROcodone-acetaminophen (NORCO)  MG per tablet; Take 1 tablet by mouth Every 8 (Eight) Hours As Needed for Moderate Pain (low back pain).  Dispense: 30 tablet; Refill: 0  -     CBC (No Diff); Future  -     Comprehensive Metabolic Panel; Future  -     Lipid Panel; Future  -     Vitamin D,25-Hydroxy; Future  -     PSA Screen; Future  -     Ambulatory Referral to Neurosurgery    2. Primary hypertension  -     indomethacin SR (INDOCIN SR) 75 MG CR capsule; Take 1 capsule by mouth 2 (Two) Times a Day With Meals.  Dispense: 20 capsule; Refill: 3  -     hydrALAZINE (APRESOLINE) 25 MG tablet; Take 1 tablet by mouth 3 (Three) Times a Day.  Dispense: 90 tablet; Refill: 3  -     CBC (No Diff); Future  -     Comprehensive Metabolic Panel; Future  -     Lipid Panel;  Future  -     Vitamin D,25-Hydroxy; Future  -     PSA Screen; Future    3. Idiopathic gout, unspecified chronicity, unspecified site  -     CBC (No Diff); Future  -     Comprehensive Metabolic Panel; Future  -     Lipid Panel; Future  -     Vitamin D,25-Hydroxy; Future  -     PSA Screen; Future  -     Uric Acid    4. Prostate cancer screening  -     CBC (No Diff); Future  -     Comprehensive Metabolic Panel; Future  -     Lipid Panel; Future  -     Vitamin D,25-Hydroxy; Future  -     PSA Screen; Future    Other orders  -     sildenafil (Viagra) 100 MG tablet; Take 1 tablet by mouth Daily As Needed for Erectile Dysfunction.  Dispense: 10 tablet; Refill: 3             Follow Up   Return in about 4 months (around 6/9/2023), or if symptoms worsen or fail to improve, for Recheck.  Patient was given instructions and counseling regarding his condition or for health maintenance advice. Please see specific information pulled into the AVS if appropriate.

## 2023-03-20 RX ORDER — SILDENAFIL 100 MG/1
100 TABLET, FILM COATED ORAL DAILY PRN
Qty: 10 TABLET | Refills: 3 | Status: SHIPPED | OUTPATIENT
Start: 2023-03-20

## 2023-03-20 NOTE — TELEPHONE ENCOUNTER
Caller: [place]ali qui[place]    Relationship: Self    Best call back number: [phone]    Requested Prescriptions:   Requested Prescriptions     Pending Prescriptions Disp Refills   • sildenafil (Viagra) 100 MG tablet 10 tablet 3     Sig: Take 1 tablet by mouth Daily As Needed for Erectile Dysfunction.        Pharmacy where request should be sent: [facility] [id number] [address] [address] AT [facility] ([address] )  [phone] [facility] [phone] FX     Additional details provided by patient: PATIENT IS OUT OF THIS MEDICATION     Does the patient have less than a 3 day supply:  [x] Yes  [] No    Would you like a call back once the refill request has been completed: [x] Yes [] No    If the office needs to give you a call back, can they leave a voicemail: [x] Yes [] No    Win Price Rep   03/20/23 13:12 EDT

## 2023-04-21 DIAGNOSIS — I10 PRIMARY HYPERTENSION: ICD-10-CM

## 2023-04-21 RX ORDER — CLONIDINE HYDROCHLORIDE 0.1 MG/1
TABLET ORAL
Qty: 180 TABLET | Refills: 0 | Status: SHIPPED | OUTPATIENT
Start: 2023-04-21

## 2023-04-21 RX ORDER — VERAPAMIL HYDROCHLORIDE 240 MG/1
TABLET, FILM COATED, EXTENDED RELEASE ORAL
Qty: 90 TABLET | Refills: 0 | Status: SHIPPED | OUTPATIENT
Start: 2023-04-21

## 2023-05-26 DIAGNOSIS — I10 PRIMARY HYPERTENSION: ICD-10-CM

## 2023-05-26 RX ORDER — CLONIDINE HYDROCHLORIDE 0.1 MG/1
TABLET ORAL
Qty: 180 TABLET | Refills: 0 | Status: SHIPPED | OUTPATIENT
Start: 2023-05-26

## 2023-05-26 RX ORDER — VERAPAMIL HYDROCHLORIDE 240 MG/1
TABLET, FILM COATED, EXTENDED RELEASE ORAL
Qty: 90 TABLET | Refills: 0 | Status: SHIPPED | OUTPATIENT
Start: 2023-05-26

## 2023-11-20 ENCOUNTER — OFFICE VISIT (OUTPATIENT)
Dept: FAMILY MEDICINE CLINIC | Facility: CLINIC | Age: 63
End: 2023-11-20
Payer: COMMERCIAL

## 2023-11-20 VITALS
SYSTOLIC BLOOD PRESSURE: 148 MMHG | WEIGHT: 204 LBS | DIASTOLIC BLOOD PRESSURE: 84 MMHG | BODY MASS INDEX: 32.02 KG/M2 | HEART RATE: 66 BPM | RESPIRATION RATE: 18 BRPM | TEMPERATURE: 97.3 F | OXYGEN SATURATION: 99 % | HEIGHT: 67 IN

## 2023-11-20 DIAGNOSIS — Z00.00 ANNUAL PHYSICAL EXAM: Primary | ICD-10-CM

## 2023-11-20 DIAGNOSIS — E66.01 MORBID (SEVERE) OBESITY DUE TO EXCESS CALORIES: Chronic | ICD-10-CM

## 2023-11-20 DIAGNOSIS — I10 PRIMARY HYPERTENSION: Chronic | ICD-10-CM

## 2023-11-20 DIAGNOSIS — G89.29 CHRONIC LEFT-SIDED LOW BACK PAIN WITH LEFT-SIDED SCIATICA: Chronic | ICD-10-CM

## 2023-11-20 DIAGNOSIS — K08.9 POOR DENTITION: ICD-10-CM

## 2023-11-20 DIAGNOSIS — G47.33 OBSTRUCTIVE SLEEP APNEA SYNDROME: ICD-10-CM

## 2023-11-20 DIAGNOSIS — M10.00 IDIOPATHIC GOUT, UNSPECIFIED CHRONICITY, UNSPECIFIED SITE: Chronic | ICD-10-CM

## 2023-11-20 DIAGNOSIS — M54.42 CHRONIC LEFT-SIDED LOW BACK PAIN WITH LEFT-SIDED SCIATICA: Chronic | ICD-10-CM

## 2023-11-20 DIAGNOSIS — Z12.5 PROSTATE CANCER SCREENING: ICD-10-CM

## 2023-11-20 PROBLEM — J18.9 PNEUMONIA DUE TO INFECTIOUS ORGANISM: Status: RESOLVED | Noted: 2022-03-28 | Resolved: 2023-11-20

## 2023-11-20 RX ORDER — VERAPAMIL HYDROCHLORIDE 240 MG/1
240 TABLET, FILM COATED, EXTENDED RELEASE ORAL NIGHTLY
Qty: 90 TABLET | Refills: 1 | Status: SHIPPED | OUTPATIENT
Start: 2023-11-20

## 2023-11-20 RX ORDER — HYDROCODONE BITARTRATE AND ACETAMINOPHEN 10; 325 MG/1; MG/1
1 TABLET ORAL EVERY 8 HOURS PRN
Qty: 30 TABLET | Refills: 0 | Status: SHIPPED | OUTPATIENT
Start: 2023-11-20

## 2023-11-20 RX ORDER — HYDRALAZINE HYDROCHLORIDE 25 MG/1
25 TABLET, FILM COATED ORAL 3 TIMES DAILY
Qty: 90 TABLET | Refills: 1 | Status: SHIPPED | OUTPATIENT
Start: 2023-11-20

## 2023-11-20 RX ORDER — CLONIDINE HYDROCHLORIDE 0.1 MG/1
0.1 TABLET ORAL 2 TIMES DAILY
Qty: 180 TABLET | Refills: 1 | Status: SHIPPED | OUTPATIENT
Start: 2023-11-20

## 2023-11-20 RX ORDER — INDOMETHACIN 75 MG/1
75 CAPSULE, EXTENDED RELEASE ORAL 2 TIMES DAILY WITH MEALS
Qty: 20 CAPSULE | Refills: 3 | Status: SHIPPED | OUTPATIENT
Start: 2023-11-20

## 2023-11-20 NOTE — ASSESSMENT & PLAN NOTE
Patient's (Body mass index is 31.95 kg/m².) indicates that they are obese (BMI >30) with health conditions that include obstructive sleep apnea and hypertension . Weight is improving with lifestyle modifications. BMI  is above average; BMI management plan is completed. We discussed portion control and increasing exercise.   Discussed the importance of healthy diet, nutrition, and lifestyle. Recommend low salt, fat/cholesterol diet and avoid concentrated sweets. Encouraged DASH diet along with fresh fruits & vegetables and low fat dairy products. Counseled patient to exercise/walk as tolerated. Avoid tobacco and alcohol use.

## 2023-11-20 NOTE — ASSESSMENT & PLAN NOTE
Hypertension is improving with treatment.  Continue current treatment regimen.  Dietary sodium restriction.  Ambulatory blood pressure monitoring.  Blood pressure will be reassessed at the next regular appointment.  Instructed patient to return to clinic prior to running out of his medications.  Inform patient will prescribe 6-month supply of antihypertensives.  Discussed the importance of healthy diet, nutrition, and lifestyle. Recommend low salt, fat/cholesterol diet and avoid concentrated sweets. Encouraged DASH diet along with fresh fruits & vegetables and low fat dairy products. Counseled patient to exercise/walk as tolerated. Avoid tobacco and alcohol use.

## 2023-11-20 NOTE — PROGRESS NOTES
"Chief Complaint  Hypertension (Due refills on meds/out of Clonidine 1 week) and Back Pain (Low back has hx of 2 bad discs/ Dr Urena would give him pain meds when needed and he is out now)    Subjective        Neftali Rogers presents to [facility]  History of Present Illness  61yo AAM with past medical history significant for hypertension and chronic low back pain Patient was previously seen by PCP Dr. Urena at [facility], however patient is new to me and is here for establishment of care visit for chronic medical conditions including hypertension, chronic low back pain.    Patient requesting refill on his therapy pill supply of hydrocodone for pain.  Inform patient will refer to pain management but refilled this time, patient voiced understanding.    Patient refused all preventive vaccinations which include influenza, COVID booster, Tdap, Prevnar.  Patient reports last colonoscopy was within the last 5 years.  Hypertension    Back Pain        Objective   Vital Signs:  /84 (BP Location: Left arm, Patient Position: Sitting, Cuff Size: Adult)   Pulse 66   Temp 97.3 °F (36.3 °C) (Infrared)   Resp 18   Ht 170.2 cm (67\")   Wt 92.5 kg (204 lb)   SpO2 99%   BMI 31.95 kg/m²   Estimated body mass index is 31.95 kg/m² as calculated from the following:    Height as of this encounter: 170.2 cm (67\").    Weight as of this encounter: 92.5 kg (204 lb).       BMI is >= 30 and <35. (Class 1 Obesity). The following options were offered after discussion;: exercise counseling/recommendations and nutrition counseling/recommendations      Physical Exam  Constitutional:       Appearance: Normal appearance.   HENT:      Head: Normocephalic and atraumatic.      Mouth/Throat:      Comments: Poor dentition  Eyes:      Conjunctiva/sclera: Conjunctivae normal.   Cardiovascular:      Rate and Rhythm: Normal rate and regular rhythm.      Heart sounds: Normal " heart sounds.   Pulmonary:      Effort: Pulmonary effort is normal.      Breath sounds: Normal breath sounds.   Abdominal:      General: Bowel sounds are normal.      Palpations: Abdomen is soft.      Comments: Non-tender   Skin:     General: Skin is warm.   Neurological:      General: No focal deficit present.      Mental Status: He is alert and oriented to person, place, and time.   Psychiatric:         Mood and Affect: Mood normal.         Behavior: Behavior normal.        Result Review :  The following data was reviewed by: FERDINAND Rosa on 11/20/2023:    Data reviewed : Radiologic studies MRI of lumbar spine done in 2021 reveals degenerative disc disease and facet arthritis with few other changes.  and Consultant notes from urgent care visit note from March 2023 reviewed for thigh muscle spasm episode.             Assessment and Plan   Diagnoses and all orders for this visit:    1. Annual physical exam (Primary)  Assessment & Plan:  Counseling was provided on nutrition, physical activity, development, and injury prevention, dental health, and safe sex practices patient verbalizes understanding no additional questions were asked.           2. Primary hypertension  Assessment & Plan:  Hypertension is improving with treatment.  Continue current treatment regimen.  Dietary sodium restriction.  Ambulatory blood pressure monitoring.  Blood pressure will be reassessed at the next regular appointment.  Instructed patient to return to clinic prior to running out of his medications.  Inform patient will prescribe 6-month supply of antihypertensives.  Discussed the importance of healthy diet, nutrition, and lifestyle. Recommend low salt, fat/cholesterol diet and avoid concentrated sweets. Encouraged DASH diet along with fresh fruits & vegetables and low fat dairy products. Counseled patient to exercise/walk as tolerated. Avoid tobacco and alcohol use.      Orders:  -     cloNIDine (CATAPRES) 0.1 MG tablet;  Take 1 tablet by mouth 2 (Two) Times a Day.  Dispense: 180 tablet; Refill: 1  -     hydrALAZINE (APRESOLINE) 25 MG tablet; Take 1 tablet by mouth 3 (Three) Times a Day.  Dispense: 90 tablet; Refill: 1  -     verapamil SR (CALAN-SR) 240 MG CR tablet; Take 1 tablet by mouth Every Night.  Dispense: 90 tablet; Refill: 1  -     CBC & Differential  -     Lipid Panel  -     Comprehensive Metabolic Panel  -     Urinalysis With Microscopic - Urine, Clean Catch  -     TSH Rfx On Abnormal To Free T4  -     indomethacin SR (INDOCIN SR) 75 MG CR capsule; Take 1 capsule by mouth 2 (Two) Times a Day With Meals.  Dispense: 20 capsule; Refill: 3    3. Morbid (severe) obesity due to excess calories  Assessment & Plan:  Patient's (Body mass index is 31.95 kg/m².) indicates that they are obese (BMI >30) with health conditions that include obstructive sleep apnea and hypertension . Weight is improving with lifestyle modifications. BMI  is above average; BMI management plan is completed. We discussed portion control and increasing exercise.   Discussed the importance of healthy diet, nutrition, and lifestyle. Recommend low salt, fat/cholesterol diet and avoid concentrated sweets. Encouraged DASH diet along with fresh fruits & vegetables and low fat dairy products. Counseled patient to exercise/walk as tolerated. Avoid tobacco and alcohol use.        4. Idiopathic gout, unspecified chronicity, unspecified site  Assessment & Plan:  Instructed patient to monitor his diet and find precipitants that cause gout attacks      5. Chronic left-sided low back pain with left-sided sciatica  Assessment & Plan:  Referred to pain management.  Patient refused to do a repeat x-ray today.    Orders:  -     Ambulatory Referral to Pain Management  -     HYDROcodone-acetaminophen (NORCO)  MG per tablet; Take 1 tablet by mouth Every 8 (Eight) Hours As Needed for Moderate Pain (low back pain).  Dispense: 30 tablet; Refill: 0    6. Obstructive sleep apnea  syndrome  Assessment & Plan:  Pt refused sleep study referral.  Counseled patient to lose roughly 10% of his body weight and see if it improves his snoring and sleep apnea symptoms.  Inform patient will refer to sleep study when patient is ready and agreeable to the referral.      7. Prostate cancer screening  -     PSA Screen    8. Poor dentition  Assessment & Plan:  Instructed patient to follow-up with dentist ASAP, patient voiced understanding.               Follow Up   Return in about 6 months (around 5/20/2024).  Patient was given instructions and counseling regarding his condition or for health maintenance advice. Please see specific information pulled into the AVS if appropriate.

## 2024-03-05 RX ORDER — SILDENAFIL 100 MG/1
100 TABLET, FILM COATED ORAL DAILY PRN
Qty: 10 TABLET | Refills: 3 | Status: SHIPPED | OUTPATIENT
Start: 2024-03-05

## 2024-03-22 ENCOUNTER — OFFICE VISIT (OUTPATIENT)
Dept: FAMILY MEDICINE CLINIC | Facility: CLINIC | Age: 64
End: 2024-03-22
Payer: COMMERCIAL

## 2024-03-22 VITALS
BODY MASS INDEX: 30.76 KG/M2 | SYSTOLIC BLOOD PRESSURE: 160 MMHG | HEIGHT: 67 IN | DIASTOLIC BLOOD PRESSURE: 90 MMHG | OXYGEN SATURATION: 97 % | TEMPERATURE: 99.1 F | WEIGHT: 196 LBS | HEART RATE: 70 BPM

## 2024-03-22 DIAGNOSIS — G89.29 CHRONIC LEFT-SIDED LOW BACK PAIN WITH LEFT-SIDED SCIATICA: Chronic | ICD-10-CM

## 2024-03-22 DIAGNOSIS — Z72.0 TOBACCO USE: ICD-10-CM

## 2024-03-22 DIAGNOSIS — M10.00 IDIOPATHIC GOUT, UNSPECIFIED CHRONICITY, UNSPECIFIED SITE: Chronic | ICD-10-CM

## 2024-03-22 DIAGNOSIS — M51.36 DDD (DEGENERATIVE DISC DISEASE), LUMBAR: ICD-10-CM

## 2024-03-22 DIAGNOSIS — I10 PRIMARY HYPERTENSION: Primary | Chronic | ICD-10-CM

## 2024-03-22 DIAGNOSIS — R09.89 RHONCHI: ICD-10-CM

## 2024-03-22 DIAGNOSIS — M54.42 CHRONIC LEFT-SIDED LOW BACK PAIN WITH LEFT-SIDED SCIATICA: Chronic | ICD-10-CM

## 2024-03-22 PROBLEM — Z00.00 ANNUAL PHYSICAL EXAM: Status: RESOLVED | Noted: 2023-11-20 | Resolved: 2024-03-22

## 2024-03-22 PROBLEM — M51.369 DDD (DEGENERATIVE DISC DISEASE), LUMBAR: Status: ACTIVE | Noted: 2024-03-22

## 2024-03-22 PROCEDURE — 99214 OFFICE O/P EST MOD 30 MIN: CPT | Performed by: STUDENT IN AN ORGANIZED HEALTH CARE EDUCATION/TRAINING PROGRAM

## 2024-03-22 RX ORDER — HYDROCODONE BITARTRATE AND ACETAMINOPHEN 10; 325 MG/1; MG/1
1 TABLET ORAL EVERY 8 HOURS PRN
Qty: 30 TABLET | Refills: 0 | Status: SHIPPED | OUTPATIENT
Start: 2024-03-22

## 2024-03-22 RX ORDER — HYDROCHLOROTHIAZIDE 12.5 MG/1
12.5 TABLET ORAL DAILY
Qty: 90 TABLET | Refills: 1 | Status: SHIPPED | OUTPATIENT
Start: 2024-03-22 | End: 2024-03-22 | Stop reason: SDUPTHER

## 2024-03-22 RX ORDER — CLONIDINE HYDROCHLORIDE 0.1 MG/1
0.1 TABLET ORAL 2 TIMES DAILY
Qty: 180 TABLET | Refills: 1 | Status: SHIPPED | OUTPATIENT
Start: 2024-03-22

## 2024-03-22 RX ORDER — HYDRALAZINE HYDROCHLORIDE 25 MG/1
25 TABLET, FILM COATED ORAL 3 TIMES DAILY
Qty: 90 TABLET | Refills: 1 | Status: SHIPPED | OUTPATIENT
Start: 2024-03-22

## 2024-03-22 RX ORDER — ALBUTEROL SULFATE 90 UG/1
2 AEROSOL, METERED RESPIRATORY (INHALATION) EVERY 6 HOURS PRN
Qty: 8.7 G | Refills: 5 | Status: SHIPPED | OUTPATIENT
Start: 2024-03-22

## 2024-03-22 RX ORDER — HYDROCHLOROTHIAZIDE 12.5 MG/1
12.5 TABLET ORAL DAILY
Qty: 90 TABLET | Refills: 1 | Status: SHIPPED | OUTPATIENT
Start: 2024-03-22 | End: 2024-09-18

## 2024-03-22 RX ORDER — INDOMETHACIN 75 MG/1
75 CAPSULE, EXTENDED RELEASE ORAL 2 TIMES DAILY WITH MEALS
Qty: 20 CAPSULE | Refills: 3 | Status: SHIPPED | OUTPATIENT
Start: 2024-03-22

## 2024-03-22 RX ORDER — HYDROCODONE BITARTRATE AND ACETAMINOPHEN 10; 325 MG/1; MG/1
1 TABLET ORAL EVERY 8 HOURS PRN
Qty: 30 TABLET | Refills: 0 | Status: CANCELLED | OUTPATIENT
Start: 2024-03-22

## 2024-03-22 NOTE — PROGRESS NOTES
"Chief Complaint  Follow-up    Subjective        Neftali Rogers presents to [facility]  History of Present Illness  63-year-old -American male with a history of hypertension and other medical problems here for follow-up visit.    Pt reports he has been inconsistent in terms of taking his HTN meds.  Advised patient need to change blood pressure regimen as blood pressure is uncontrolled.  Patient was somewhat hesitant however was still agreeable to start HCTZ 12.5 mg once every morning.  Counseled patient on the risks of uncontrolled hypertension, patient voiced understanding.  Pt reoprts unsure if he has been taking  hydralazine.    Patient desired a sample of an inhaler.  Instructed patient to get the adult preventive immunization that is due the pharmacy-shingles, RSV, COVID booster.          Objective   Vital Signs:  /90 (BP Location: Left arm, Patient Position: Sitting, Cuff Size: Adult)   Pulse 70   Temp 99.1 °F (37.3 °C) (Temporal)   Ht 170.2 cm (67.01\")   Wt 88.9 kg (196 lb)   SpO2 97%   BMI 30.69 kg/m²   Estimated body mass index is 30.69 kg/m² as calculated from the following:    Height as of this encounter: 170.2 cm (67.01\").    Weight as of this encounter: 88.9 kg (196 lb).               Physical Exam  Constitutional:       Appearance: Normal appearance.   HENT:      Head: Normocephalic and atraumatic.   Eyes:      Conjunctiva/sclera: Conjunctivae normal.   Cardiovascular:      Rate and Rhythm: Normal rate and regular rhythm.      Heart sounds: Normal heart sounds.   Pulmonary:      Effort: Pulmonary effort is normal.      Breath sounds: Rhonchi present.      Comments: Diffuse bilateral rhonchi    Abdominal:      General: Bowel sounds are normal.      Palpations: Abdomen is soft.      Comments: Non-tender   Skin:     General: Skin is warm.   Neurological:      General: No focal deficit present.      Mental Status: He is alert and oriented to person, " place, and time.   Psychiatric:         Mood and Affect: Mood normal.         Behavior: Behavior normal.        Result Review :    The following data was reviewed by: FERDINAND Rosa on 03/22/2024:                             Assessment and Plan     Diagnoses and all orders for this visit:    1. Primary hypertension (Primary)  Assessment & Plan:  Hypertension is uncontrolled  Medication changes per orders.  Dietary sodium restriction.  Weight loss.  Stop smoking.  Ambulatory blood pressure monitoring.  Blood pressure will be reassessedin 3 months.  Started HCTZ 12.5 mg once every morning.  Counseled patient on compliance with antihypertensive regimen and to take all medications as prescribed.  Counseled patient also on his concerns regarding cost of medications.  Although patient's antihypertensives are $4 program at [facility] and the maximum cost is $16 for a 90-day supply, inform patient and educated him on the $4 program at [facility].  Discussed the importance of healthy diet, nutrition, and lifestyle. Recommend low salt, fat/cholesterol diet and avoid concentrated sweets. Encouraged DASH diet along with fresh fruits & vegetables and low fat dairy products. Counseled patient to exercise/walk as tolerated. Avoid tobacco and alcohol use.      Orders:  -     Discontinue: hydroCHLOROthiazide 12.5 MG oral; Take 1 each by mouth Daily for 180 days.  Dispense: 90 tablet; Refill: 1  -     hydrALAZINE (APRESOLINE) 25 MG tablet; Take 1 tablet by mouth 3 (Three) Times a Day.  Dispense: 90 tablet; Refill: 1  -     hydroCHLOROthiazide 12.5 MG oral; Take 1 each by mouth Daily for 180 days.  Dispense: 90 tablet; Refill: 1  -     cloNIDine (CATAPRES) 0.1 MG tablet; Take 1 tablet by mouth 2 (Two) Times a Day.  Dispense: 180 tablet; Refill: 1    2. Chronic left-sided low back pain with left-sided sciatica  Assessment & Plan:  Checked patient's ZANA report prior to prescribing refill on hydrocodone.  Also had patient sign  a narcotic contract today at the clinic.  Also ordered drug compliance urine test.    Orders:  -     Compliance Drug Analysis, Ur - Urine, Clean Catch    3. Idiopathic gout, unspecified chronicity, unspecified site  Assessment & Plan:  Discussed gout diet and prevention of recurrent gouty attacks by proper diet, patient voiced understanding.    Orders:  -     indomethacin SR (INDOCIN SR) 75 MG CR capsule; Take 1 capsule by mouth 2 (Two) Times a Day With Meals.  Dispense: 20 capsule; Refill: 3    4. Rhonchi  Assessment & Plan:  Patient refused a referral for a spirometry today.  Counseled patient need to diagnose and diagnose Chronic Obstructive Pulmonary Disease and that would allow prescription of appropriate inhalers.  Also counseled patient on tobacco cessation.    Orders:  -     albuterol sulfate  (90 Base) MCG/ACT inhaler; Inhale 2 puffs Every 6 (Six) Hours As Needed for Wheezing or Shortness of Air.  Dispense: 8.7 g; Refill: 5    5. Tobacco use  Assessment & Plan:  Discussed Tobacco cessation with patient. Patient counseled to gradually cut back on Tobacco use gradually, on a weekly basis. Patient to follow-up with clinic once ready to quit completely for adjunct therapy for Tobacco cessation. Also counseled patient to avoid second-hand Tobacco smoke, patient voiced understanding.        6. DDD (degenerative disc disease), lumbar  -     Compliance Drug Analysis, Ur - Urine, Clean Catch        All chronic conditions have been addressed and treated by the practice or other specialists. Medications have been reconciled and refilled as appropriate. Reiterated compliance and timely follow up appointments. Side effects of all new and old medications reviewed with the patient and patient willing to accept all risks involved. Advised RTO if no improvement or worsening of symptoms or if any new complaints arise. Patient advised to follow up with clinic or call after diagnostic tests, if patient does not hear  from office 3 days after the test completion.          Follow Up     Return in about 4 months (around 7/22/2024) for Next scheduled follow up.  Patient was given instructions and counseling regarding his condition or for health maintenance advice. Please see specific information pulled into the AVS if appropriate.

## 2024-03-22 NOTE — ASSESSMENT & PLAN NOTE
Checked patient's ZANA report prior to prescribing refill on hydrocodone.  Also had patient sign a narcotic contract today at the clinic.  Also ordered drug compliance urine test.

## 2024-03-22 NOTE — ASSESSMENT & PLAN NOTE
Discussed gout diet and prevention of recurrent gouty attacks by proper diet, patient voiced understanding.

## 2024-03-22 NOTE — ASSESSMENT & PLAN NOTE
Patient refused a referral for a spirometry today.  Counseled patient need to diagnose and diagnose Chronic Obstructive Pulmonary Disease and that would allow prescription of appropriate inhalers.  Also counseled patient on tobacco cessation.

## 2024-03-22 NOTE — ASSESSMENT & PLAN NOTE
Hypertension is uncontrolled  Medication changes per orders.  Dietary sodium restriction.  Weight loss.  Stop smoking.  Ambulatory blood pressure monitoring.  Blood pressure will be reassessedin 3 months.  Started HCTZ 12.5 mg once every morning.  Counseled patient on compliance with antihypertensive regimen and to take all medications as prescribed.  Counseled patient also on his concerns regarding cost of medications.  Although patient's antihypertensives are $4 program at [facility] and the maximum cost is $16 for a 90-day supply, inform patient and educated him on the $4 program at [facility].  Discussed the importance of healthy diet, nutrition, and lifestyle. Recommend low salt, fat/cholesterol diet and avoid concentrated sweets. Encouraged DASH diet along with fresh fruits & vegetables and low fat dairy products. Counseled patient to exercise/walk as tolerated. Avoid tobacco and alcohol use.

## 2024-03-23 LAB
ALBUMIN SERPL-MCNC: 4.5 G/DL (ref 3.9–4.9)
ALBUMIN/GLOB SERPL: 1.4 {RATIO} (ref 1.2–2.2)
ALP SERPL-CCNC: 95 IU/L (ref 44–121)
ALT SERPL-CCNC: 5 IU/L (ref 0–44)
APPEARANCE UR: CLEAR
AST SERPL-CCNC: 12 IU/L (ref 0–40)
BACTERIA #/AREA URNS HPF: NORMAL /[HPF]
BASOPHILS # BLD AUTO: 0.1 X10E3/UL (ref 0–0.2)
BASOPHILS NFR BLD AUTO: 1 %
BILIRUB SERPL-MCNC: 0.6 MG/DL (ref 0–1.2)
BILIRUB UR QL STRIP: NEGATIVE
BUN SERPL-MCNC: 8 MG/DL (ref 8–27)
BUN/CREAT SERPL: 9 (ref 10–24)
CALCIUM SERPL-MCNC: 9.5 MG/DL (ref 8.6–10.2)
CASTS URNS QL MICRO: NORMAL /LPF
CHLORIDE SERPL-SCNC: 102 MMOL/L (ref 96–106)
CHOLEST SERPL-MCNC: 160 MG/DL (ref 100–199)
CO2 SERPL-SCNC: 24 MMOL/L (ref 20–29)
COLOR UR: YELLOW
CREAT SERPL-MCNC: 0.92 MG/DL (ref 0.76–1.27)
EGFRCR SERPLBLD CKD-EPI 2021: 94 ML/MIN/1.73
EOSINOPHIL # BLD AUTO: 0.6 X10E3/UL (ref 0–0.4)
EOSINOPHIL NFR BLD AUTO: 7 %
EPI CELLS #/AREA URNS HPF: NORMAL /HPF (ref 0–10)
ERYTHROCYTE [DISTWIDTH] IN BLOOD BY AUTOMATED COUNT: 13.9 % (ref 11.6–15.4)
GLOBULIN SER CALC-MCNC: 3.3 G/DL (ref 1.5–4.5)
GLUCOSE SERPL-MCNC: 80 MG/DL (ref 70–99)
GLUCOSE UR QL STRIP: NEGATIVE
HCT VFR BLD AUTO: 50.3 % (ref 37.5–51)
HDLC SERPL-MCNC: 58 MG/DL
HGB BLD-MCNC: 17.5 G/DL (ref 13–17.7)
HGB UR QL STRIP: NEGATIVE
IMM GRANULOCYTES # BLD AUTO: 0 X10E3/UL (ref 0–0.1)
IMM GRANULOCYTES NFR BLD AUTO: 0 %
KETONES UR QL STRIP: NEGATIVE
LDLC SERPL CALC-MCNC: 82 MG/DL (ref 0–99)
LEUKOCYTE ESTERASE UR QL STRIP: NEGATIVE
LYMPHOCYTES # BLD AUTO: 2.7 X10E3/UL (ref 0.7–3.1)
LYMPHOCYTES NFR BLD AUTO: 37 %
MCH RBC QN AUTO: 32.1 PG (ref 26.6–33)
MCHC RBC AUTO-ENTMCNC: 34.8 G/DL (ref 31.5–35.7)
MCV RBC AUTO: 92 FL (ref 79–97)
MICRO URNS: NORMAL
MICRO URNS: NORMAL
MONOCYTES # BLD AUTO: 0.6 X10E3/UL (ref 0.1–0.9)
MONOCYTES NFR BLD AUTO: 9 %
NEUTROPHILS # BLD AUTO: 3.4 X10E3/UL (ref 1.4–7)
NEUTROPHILS NFR BLD AUTO: 46 %
NITRITE UR QL STRIP: NEGATIVE
PH UR STRIP: 6.5 [PH] (ref 5–7.5)
PLATELET # BLD AUTO: 244 X10E3/UL (ref 150–450)
POTASSIUM SERPL-SCNC: 4.1 MMOL/L (ref 3.5–5.2)
PROT SERPL-MCNC: 7.8 G/DL (ref 6–8.5)
PROT UR QL STRIP: NEGATIVE
PSA SERPL-MCNC: 1.2 NG/ML (ref 0–4)
RBC # BLD AUTO: 5.45 X10E6/UL (ref 4.14–5.8)
RBC #/AREA URNS HPF: NORMAL /HPF (ref 0–2)
SODIUM SERPL-SCNC: 141 MMOL/L (ref 134–144)
SP GR UR STRIP: 1 (ref 1–1.03)
TRIGL SERPL-MCNC: 114 MG/DL (ref 0–149)
TSH SERPL DL<=0.005 MIU/L-ACNC: 0.82 UIU/ML (ref 0.45–4.5)
UROBILINOGEN UR STRIP-MCNC: 0.2 MG/DL (ref 0.2–1)
VLDLC SERPL CALC-MCNC: 20 MG/DL (ref 5–40)
WBC # BLD AUTO: 7.5 X10E3/UL (ref 3.4–10.8)
WBC #/AREA URNS HPF: NORMAL /HPF (ref 0–5)

## 2024-04-01 LAB — DRUGS UR: NORMAL

## 2024-05-06 ENCOUNTER — TELEPHONE (OUTPATIENT)
Dept: FAMILY MEDICINE CLINIC | Facility: CLINIC | Age: 64
End: 2024-05-06
Payer: COMMERCIAL

## 2024-05-06 DIAGNOSIS — I10 PRIMARY HYPERTENSION: Chronic | ICD-10-CM

## 2024-05-06 RX ORDER — HYDROCHLOROTHIAZIDE 12.5 MG/1
12.5 TABLET ORAL DAILY
Qty: 90 TABLET | Refills: 1 | Status: SHIPPED | OUTPATIENT
Start: 2024-05-06 | End: 2024-11-02

## 2024-08-09 ENCOUNTER — OFFICE VISIT (OUTPATIENT)
Dept: FAMILY MEDICINE CLINIC | Facility: CLINIC | Age: 64
End: 2024-08-09
Payer: COMMERCIAL

## 2024-08-09 VITALS
TEMPERATURE: 97.1 F | BODY MASS INDEX: 30.32 KG/M2 | WEIGHT: 193.2 LBS | DIASTOLIC BLOOD PRESSURE: 90 MMHG | OXYGEN SATURATION: 99 % | HEART RATE: 76 BPM | HEIGHT: 67 IN | SYSTOLIC BLOOD PRESSURE: 150 MMHG

## 2024-08-09 DIAGNOSIS — I10 PRIMARY HYPERTENSION: Primary | Chronic | ICD-10-CM

## 2024-08-09 DIAGNOSIS — M10.00 IDIOPATHIC GOUT, UNSPECIFIED CHRONICITY, UNSPECIFIED SITE: Chronic | ICD-10-CM

## 2024-08-09 DIAGNOSIS — Z72.0 TOBACCO USE: ICD-10-CM

## 2024-08-09 DIAGNOSIS — K08.9 POOR DENTITION: ICD-10-CM

## 2024-08-09 DIAGNOSIS — E66.01 MORBID (SEVERE) OBESITY DUE TO EXCESS CALORIES: ICD-10-CM

## 2024-08-09 PROBLEM — R09.89 RHONCHI: Status: RESOLVED | Noted: 2024-03-22 | Resolved: 2024-08-09

## 2024-08-09 PROBLEM — Z12.5 PROSTATE CANCER SCREENING: Status: RESOLVED | Noted: 2023-11-20 | Resolved: 2024-08-09

## 2024-08-09 PROCEDURE — 99214 OFFICE O/P EST MOD 30 MIN: CPT | Performed by: STUDENT IN AN ORGANIZED HEALTH CARE EDUCATION/TRAINING PROGRAM

## 2024-08-09 RX ORDER — CLONIDINE HYDROCHLORIDE 0.1 MG/1
0.1 TABLET ORAL 2 TIMES DAILY
Qty: 180 TABLET | Refills: 1 | Status: SHIPPED | OUTPATIENT
Start: 2024-08-09

## 2024-08-09 RX ORDER — HYDROCHLOROTHIAZIDE 12.5 MG/1
12.5 TABLET ORAL EVERY MORNING
Qty: 90 TABLET | Refills: 1 | Status: SHIPPED | OUTPATIENT
Start: 2024-08-09 | End: 2025-02-05

## 2024-08-09 RX ORDER — HYDRALAZINE HYDROCHLORIDE 25 MG/1
25 TABLET, FILM COATED ORAL 3 TIMES DAILY
Qty: 270 TABLET | Refills: 1 | Status: SHIPPED | OUTPATIENT
Start: 2024-08-09

## 2024-08-09 RX ORDER — VERAPAMIL HYDROCHLORIDE 240 MG/1
240 TABLET, FILM COATED, EXTENDED RELEASE ORAL NIGHTLY
Qty: 90 TABLET | Refills: 1 | Status: SHIPPED | OUTPATIENT
Start: 2024-08-09

## 2024-08-09 NOTE — ASSESSMENT & PLAN NOTE
Patient has cut back on his tobacco use significantly.  Discussed Tobacco cessation with patient. Patient counseled to gradually cut back on Tobacco use gradually, on a weekly basis. Patient to follow-up with clinic once ready to quit completely for adjunct therapy for Tobacco cessation. Also counseled patient to avoid second-hand Tobacco smoke, patient voiced understanding.

## 2024-08-09 NOTE — PROGRESS NOTES
"Chief Complaint  Hypertension and Cyst (R cheek bone.  Present for about 1 year.  Not painful; was draining )    Subjective        Neftali Rogers presents to [facility]  History of Present Illness  64-year-old -American male with a history of uncontrolled hypertension here for chronic disease management follow-up visit.  Patient reports he has been out of more than one of his blood pressure medications for the past 2 weeks.    Patient denied any Chest pain, Shortness of Air, palpitations, Dizziness, Headache, Blurred vision, or weakness/numbness.   Patient also complaining of a spot which appears as a solid spot on his maxilla on 1 side.  Patient reports it has been present for a few months without significant increase in size.    Patient denied any wheezing or shortness of air.  Patient reports no gout attacks since last visit.  Patient reports he has cut back on his tobacco use and smokes only occasionally.      Objective   Vital Signs:  /90   Pulse 76   Temp 97.1 °F (36.2 °C) (Temporal)   Ht 170.2 cm (67.01\")   Wt 87.6 kg (193 lb 3.2 oz)   SpO2 99%   BMI 30.25 kg/m²   Estimated body mass index is 30.25 kg/m² as calculated from the following:    Height as of this encounter: 170.2 cm (67.01\").    Weight as of this encounter: 87.6 kg (193 lb 3.2 oz).               Physical Exam  Constitutional:       Appearance: Normal appearance.   HENT:      Head: Normocephalic and atraumatic.   Eyes:      Conjunctiva/sclera: Conjunctivae normal.   Cardiovascular:      Rate and Rhythm: Normal rate and regular rhythm.      Heart sounds: Normal heart sounds.   Pulmonary:      Effort: Pulmonary effort is normal.      Breath sounds: Normal breath sounds.   Abdominal:      General: Bowel sounds are normal.      Palpations: Abdomen is soft.      Comments: Non-tender   Skin:     General: Skin is warm.   Neurological:      General: No focal deficit present.      Mental Status: He is " alert and oriented to person, place, and time.   Psychiatric:         Mood and Affect: Mood normal.         Behavior: Behavior normal.        Result Review :    The following data was reviewed by: FERDINAND Rosa on 08/09/2024:  CMP          3/22/2024    11:38   CMP   Glucose 80    BUN 8    Creatinine 0.92    Sodium 141    Potassium 4.1    Chloride 102    Calcium 9.5    Total Protein 7.8    Albumin 4.5    Globulin 3.3    Total Bilirubin 0.6    Alkaline Phosphatase 95    AST (SGOT) 12    ALT (SGPT) 5    BUN/Creatinine Ratio 9      CBC          3/22/2024    11:38   CBC   WBC 7.5    RBC 5.45    Hemoglobin 17.5    Hematocrit 50.3    MCV 92    MCH 32.1    MCHC 34.8    RDW 13.9    Platelets 244      Lipid Panel          3/22/2024    11:38   Lipid Panel   Total Cholesterol 160    Triglycerides 114    HDL Cholesterol 58    VLDL Cholesterol 20    LDL Cholesterol  82      TSH          3/22/2024    11:38   TSH   TSH 0.824          PSA          3/22/2024    11:38   PSA   PSA 1.2                   Assessment and Plan     Diagnoses and all orders for this visit:    1. Primary hypertension (Primary)  Assessment & Plan:  Hypertension is borderline  Continue current treatment regimen.  Dietary sodium restriction.  Weight loss.  Stop smoking.  Ambulatory blood pressure monitoring.  Blood pressure will be reassessedin 3 months.  Patient does not want to return to clinic immediately.  Instructed patient to keep a log of his blood pressure at home and bring the home blood pressure log and follow-up visit.  Instructed patient to take all his blood pressure medications as prescribed.  Also instructed patient to return to clinic a week prior to running out of his medications that way his blood pressure does not increase as this could lead to complications.    Discussed the importance of healthy diet, nutrition, and lifestyle. Recommend low salt, fat/cholesterol diet and avoid concentrated sweets. Encouraged DASH diet along  with fresh fruits & vegetables and low fat dairy products. Counseled patient to exercise/walk as tolerated. Avoid tobacco and alcohol use.      Orders:  -     cloNIDine (CATAPRES) 0.1 MG tablet; Take 1 tablet by mouth 2 (Two) Times a Day.  Dispense: 180 tablet; Refill: 1  -     hydroCHLOROthiazide 12.5 MG oral; Take 1 each by mouth Every Morning for 180 days.  Dispense: 90 tablet; Refill: 1  -     verapamil SR (CALAN-SR) 240 MG CR tablet; Take 1 tablet by mouth Every Night.  Dispense: 90 tablet; Refill: 1  -     hydrALAZINE (APRESOLINE) 25 MG tablet; Take 1 tablet by mouth 3 (Three) Times a Day.  Dispense: 270 tablet; Refill: 1    2. Idiopathic gout, unspecified chronicity, unspecified site  Assessment & Plan:  Stable without meds.  Low purine diet shared with patient in AVS, patient was understanding.      3. Tobacco use  Assessment & Plan:  Patient has cut back on his tobacco use significantly.  Discussed Tobacco cessation with patient. Patient counseled to gradually cut back on Tobacco use gradually, on a weekly basis. Patient to follow-up with clinic once ready to quit completely for adjunct therapy for Tobacco cessation. Also counseled patient to avoid second-hand Tobacco smoke, patient voiced understanding.        4. Poor dentition  Assessment & Plan:  Reminded patient again to follow-up with a dentist patient reports he will follow-up with [employer] as he does not have any dental insurance.      5. Morbid (severe) obesity due to excess calories  Assessment & Plan:  Patient's (Body mass index is 30.25 kg/m².) indicates that they are obese (BMI >30) with health conditions that include hypertension . Weight is improving with lifestyle modifications. BMI  is above average; BMI management plan is completed. We discussed portion control and increasing exercise.       Discussed the importance of healthy diet, nutrition, and lifestyle. Recommend low salt, fat/cholesterol diet and avoid  concentrated sweets. Encouraged DASH diet along with fresh fruits & vegetables and low fat dairy products. Counseled patient to exercise/walk as tolerated. Avoid tobacco and alcohol use.            All chronic conditions have been addressed and treated by the practice or other specialists. Medications have been reconciled and refilled as appropriate. Reiterated compliance and timely follow up appointments. Side effects of all new and old medications reviewed with the patient and patient willing to accept all risks involved. Advised RTO if no improvement or worsening of symptoms or if any new complaints arise. Patient advised to follow up with clinic or call after diagnostic tests, if patient does not hear from office 3 days after the test completion.            Follow Up     Return in about 3 months (around 11/9/2024) for Next scheduled follow up.  Patient was given instructions and counseling regarding his condition or for health maintenance advice. Please see specific information pulled into the AVS if appropriate.

## 2024-08-09 NOTE — ASSESSMENT & PLAN NOTE
Reminded patient again to follow-up with a dentist patient reports he will follow-up with [employer] as he does not have any dental insurance.

## 2024-08-09 NOTE — ASSESSMENT & PLAN NOTE
Patient's (Body mass index is 30.25 kg/m².) indicates that they are obese (BMI >30) with health conditions that include hypertension . Weight is improving with lifestyle modifications. BMI  is above average; BMI management plan is completed. We discussed portion control and increasing exercise.       Discussed the importance of healthy diet, nutrition, and lifestyle. Recommend low salt, fat/cholesterol diet and avoid concentrated sweets. Encouraged DASH diet along with fresh fruits & vegetables and low fat dairy products. Counseled patient to exercise/walk as tolerated. Avoid tobacco and alcohol use.

## 2024-08-09 NOTE — ASSESSMENT & PLAN NOTE
Hypertension is borderline  Continue current treatment regimen.  Dietary sodium restriction.  Weight loss.  Stop smoking.  Ambulatory blood pressure monitoring.  Blood pressure will be reassessedin 3 months.  Patient does not want to return to clinic immediately.  Instructed patient to keep a log of his blood pressure at home and bring the home blood pressure log and follow-up visit.  Instructed patient to take all his blood pressure medications as prescribed.  Also instructed patient to return to clinic a week prior to running out of his medications that way his blood pressure does not increase as this could lead to complications.    Discussed the importance of healthy diet, nutrition, and lifestyle. Recommend low salt, fat/cholesterol diet and avoid concentrated sweets. Encouraged DASH diet along with fresh fruits & vegetables and low fat dairy products. Counseled patient to exercise/walk as tolerated. Avoid tobacco and alcohol use.

## 2024-10-04 RX ORDER — SILDENAFIL 100 MG/1
100 TABLET, FILM COATED ORAL DAILY PRN
Qty: 10 TABLET | Refills: 3 | OUTPATIENT
Start: 2024-10-04

## 2024-10-04 RX ORDER — SILDENAFIL 100 MG/1
100 TABLET, FILM COATED ORAL DAILY PRN
Qty: 10 TABLET | Refills: 3 | Status: SHIPPED | OUTPATIENT
Start: 2024-10-04

## 2024-11-11 ENCOUNTER — OFFICE VISIT (OUTPATIENT)
Dept: FAMILY MEDICINE CLINIC | Facility: CLINIC | Age: 64
End: 2024-11-11
Payer: COMMERCIAL

## 2024-11-11 VITALS
WEIGHT: 200.6 LBS | TEMPERATURE: 97.8 F | SYSTOLIC BLOOD PRESSURE: 124 MMHG | RESPIRATION RATE: 16 BRPM | OXYGEN SATURATION: 96 % | DIASTOLIC BLOOD PRESSURE: 70 MMHG | BODY MASS INDEX: 31.48 KG/M2 | HEIGHT: 67 IN | HEART RATE: 81 BPM

## 2024-11-11 DIAGNOSIS — J45.20 MILD INTERMITTENT ASTHMA WITHOUT COMPLICATION: ICD-10-CM

## 2024-11-11 DIAGNOSIS — M10.00 IDIOPATHIC GOUT, UNSPECIFIED CHRONICITY, UNSPECIFIED SITE: Chronic | ICD-10-CM

## 2024-11-11 DIAGNOSIS — I10 PRIMARY HYPERTENSION: Primary | Chronic | ICD-10-CM

## 2024-11-11 DIAGNOSIS — Z72.0 TOBACCO USE: ICD-10-CM

## 2024-11-11 DIAGNOSIS — M54.42 CHRONIC LEFT-SIDED LOW BACK PAIN WITH LEFT-SIDED SCIATICA: ICD-10-CM

## 2024-11-11 DIAGNOSIS — G47.33 OBSTRUCTIVE SLEEP APNEA SYNDROME: ICD-10-CM

## 2024-11-11 DIAGNOSIS — G89.29 CHRONIC LEFT-SIDED LOW BACK PAIN WITH LEFT-SIDED SCIATICA: ICD-10-CM

## 2024-11-11 PROCEDURE — 99214 OFFICE O/P EST MOD 30 MIN: CPT | Performed by: STUDENT IN AN ORGANIZED HEALTH CARE EDUCATION/TRAINING PROGRAM

## 2024-11-11 RX ORDER — INDOMETHACIN 75 MG/1
75 CAPSULE, EXTENDED RELEASE ORAL 2 TIMES DAILY WITH MEALS
Qty: 20 CAPSULE | Refills: 3 | Status: SHIPPED | OUTPATIENT
Start: 2024-11-11

## 2024-11-11 RX ORDER — HYDRALAZINE HYDROCHLORIDE 25 MG/1
25 TABLET, FILM COATED ORAL 3 TIMES DAILY
Qty: 270 TABLET | Refills: 2 | Status: SHIPPED | OUTPATIENT
Start: 2024-11-11

## 2024-11-11 RX ORDER — VERAPAMIL HYDROCHLORIDE 240 MG/1
240 TABLET, FILM COATED, EXTENDED RELEASE ORAL NIGHTLY
Qty: 90 TABLET | Refills: 2 | Status: SHIPPED | OUTPATIENT
Start: 2024-11-11

## 2024-11-11 RX ORDER — FLUTICASONE FUROATE, UMECLIDINIUM BROMIDE AND VILANTEROL TRIFENATATE 100; 62.5; 25 UG/1; UG/1; UG/1
1 POWDER RESPIRATORY (INHALATION)
Qty: 3 EACH | Refills: 0 | COMMUNITY
Start: 2024-11-11

## 2024-11-11 RX ORDER — HYDROCHLOROTHIAZIDE 12.5 MG/1
12.5 TABLET ORAL EVERY MORNING
Qty: 90 TABLET | Refills: 2 | Status: SHIPPED | OUTPATIENT
Start: 2024-11-11

## 2024-11-11 RX ORDER — CLONIDINE HYDROCHLORIDE 0.1 MG/1
0.1 TABLET ORAL 2 TIMES DAILY
Qty: 180 TABLET | Refills: 2 | Status: SHIPPED | OUTPATIENT
Start: 2024-11-11

## 2024-11-11 RX ORDER — ALBUTEROL SULFATE 90 UG/1
2 INHALANT RESPIRATORY (INHALATION) EVERY 6 HOURS PRN
Qty: 18 G | Refills: 5 | Status: SHIPPED | OUTPATIENT
Start: 2024-11-11

## 2024-11-11 NOTE — ASSESSMENT & PLAN NOTE
Patient reports he takes indomethacin only as needed which is only occasionally at this time.  Discussed low purine diet and instructed patient to read his AVS provided at checkout today.

## 2024-11-11 NOTE — ASSESSMENT & PLAN NOTE
Hypertension is stable and controlled  Continue current treatment regimen.  Dietary sodium restriction.  Weight loss.  Regular aerobic exercise.  Stop smoking.  Ambulatory blood pressure monitoring.  Blood pressure will be reassessed in 6 months.  Patient is asked to return to clinic in 6 months due to insurance difficulties.  Counseled patient was again on tobacco cessation.  Discussed the importance of healthy diet, nutrition, and lifestyle. Recommend low salt, fat/cholesterol diet and avoid concentrated sweets. Encouraged DASH diet along with fresh fruits & vegetables and low fat dairy products. Counseled patient to exercise/walk as tolerated. Avoid tobacco and alcohol use.

## 2024-11-11 NOTE — ASSESSMENT & PLAN NOTE
Patient states he cannot afford to CPAP machine.  Educated and counseled patient on the link between hypertension and sleep apnea.  Treatment plan discussed with patient, risks and complications resulting form not complying with the treatment plan discussed with patient.  Patient voiced understanding and accepts the risks of not following medical management instructions and recommendations.

## 2024-11-11 NOTE — PROGRESS NOTES
"Chief Complaint  Hypertension (3 month f/u)    Subjective        Neftali Rogers presents to [facility]  History of Present Illness  63-year-old -American male with history of hypertension here for chronic disease management follow-up visit.    Patient denied any Chest pain, Shortness of Air, palpitations, Dizziness, Headache, Blurred vision, or weakness/numbness.   Patient complaining of low back pain at times, reviewed patient's last MRI results with the patient today.  Discussed the need for repeat x-ray to look for any changes and also recommend physical therapy.  Ultimately the plan was to refer to neurosurgery and pain management.    However patient went at length complaining about his insurance issue.  Patient reports that his insurance, \"almost pays nothing.\"  Patient states he is waiting for his Social Security ultimately likely he will comply with treatment recommendations, but he has to wait for the correct age.  Patient refused flu shot today.    Patient reports history of asthma in the past.  Patient states he has not cut back on his tobacco smoking yet.  Hypertension        Objective   Vital Signs:  /70 (BP Location: Left arm, Patient Position: Sitting, Cuff Size: Adult)   Pulse 81   Temp 97.8 °F (36.6 °C) (Temporal)   Resp 16   Ht 170.2 cm (67.01\")   Wt 91 kg (200 lb 9.6 oz)   SpO2 96%   BMI 31.41 kg/m²   Estimated body mass index is 31.41 kg/m² as calculated from the following:    Height as of this encounter: 170.2 cm (67.01\").    Weight as of this encounter: 91 kg (200 lb 9.6 oz).               Physical Exam  Constitutional:       Appearance: Normal appearance.   HENT:      Head: Normocephalic and atraumatic.   Eyes:      Conjunctiva/sclera: Conjunctivae normal.   Cardiovascular:      Rate and Rhythm: Normal rate and regular rhythm.      Heart sounds: Normal heart sounds.   Pulmonary:      Effort: Pulmonary effort is normal.      Breath " sounds: Normal breath sounds.      Comments: Mild rhonchi in the lower lobe initially  Abdominal:      General: Bowel sounds are normal.      Palpations: Abdomen is soft.      Comments: Non-tender   Skin:     General: Skin is warm.      Comments: Quarter sized cyst on right anterior temporal region.   Neurological:      General: No focal deficit present.      Mental Status: He is alert and oriented to person, place, and time.   Psychiatric:         Mood and Affect: Mood normal.         Behavior: Behavior normal.        Result Review :    The following data was reviewed by: FERDINAND Rosa on 11/11/2024:  CMP          3/22/2024    11:38   CMP   Glucose 80    BUN 8    Creatinine 0.92    Sodium 141    Potassium 4.1    Chloride 102    Calcium 9.5    Total Protein 7.8    Albumin 4.5    Globulin 3.3    Total Bilirubin 0.6    Alkaline Phosphatase 95    AST (SGOT) 12    ALT (SGPT) 5    BUN/Creatinine Ratio 9      CBC          3/22/2024    11:38   CBC   WBC 7.5    RBC 5.45    Hemoglobin 17.5    Hematocrit 50.3    MCV 92    MCH 32.1    MCHC 34.8    RDW 13.9    Platelets 244      Lipid Panel          3/22/2024    11:38   Lipid Panel   Total Cholesterol 160    Triglycerides 114    HDL Cholesterol 58    VLDL Cholesterol 20    LDL Cholesterol  82      TSH          3/22/2024    11:38   TSH   TSH 0.824          PSA          3/22/2024    11:38   PSA   PSA 1.2      Data reviewed : Consultant notes reviewed last urgent care notes on file since last clinic visit today.  Patient saw urgent care back in June 2024 for hypertension and again in September for headache.  Patient denies any symptoms today and patient's blood pressure was normal today.             Assessment and Plan     Diagnoses and all orders for this visit:    1. Primary hypertension (Primary)  Assessment & Plan:  Hypertension is stable and controlled  Continue current treatment regimen.  Dietary sodium restriction.  Weight loss.  Regular aerobic  exercise.  Stop smoking.  Ambulatory blood pressure monitoring.  Blood pressure will be reassessed in 6 months.  Patient is asked to return to clinic in 6 months due to insurance difficulties.  Counseled patient was again on tobacco cessation.  Discussed the importance of healthy diet, nutrition, and lifestyle. Recommend low salt, fat/cholesterol diet and avoid concentrated sweets. Encouraged DASH diet along with fresh fruits & vegetables and low fat dairy products. Counseled patient to exercise/walk as tolerated. Avoid tobacco and alcohol use.      Orders:  -     cloNIDine (CATAPRES) 0.1 MG tablet; Take 1 tablet by mouth 2 (Two) Times a Day.  Dispense: 180 tablet; Refill: 2  -     hydrALAZINE (APRESOLINE) 25 MG tablet; Take 1 tablet by mouth 3 (Three) Times a Day.  Dispense: 270 tablet; Refill: 2  -     hydroCHLOROthiazide 12.5 MG oral; Take 1 each by mouth Every Morning.  Dispense: 90 tablet; Refill: 2  -     verapamil SR (CALAN-SR) 240 MG CR tablet; Take 1 tablet by mouth Every Night.  Dispense: 90 tablet; Refill: 2    2. Idiopathic gout, unspecified chronicity, unspecified site  Assessment & Plan:  Patient reports he takes indomethacin only as needed which is only occasionally at this time.  Discussed low purine diet and instructed patient to read his AVS provided at checkout today.    Orders:  -     indomethacin SR (INDOCIN SR) 75 MG CR capsule; Take 1 capsule by mouth 2 (Two) Times a Day With Meals.  Dispense: 20 capsule; Refill: 3    3. Tobacco use  Assessment & Plan:  Discussed Tobacco cessation with patient. Patient counseled to gradually cut back on Tobacco use gradually, on a weekly basis. Patient to follow-up with clinic once ready to quit completely for adjunct therapy for Tobacco cessation. Also counseled patient to avoid second-hand Tobacco smoke, patient voiced understanding.        4. Chronic left-sided low back pain with left-sided sciatica  Assessment & Plan:  Patient refused physical therapy,  x-ray earlier in the other referrals today.  Counseled patient on avoiding repetitive bending, lifting, prolonged standing or walking.  Recommend alternating activities and avoiding activities that worsen the low back pain.      5. Mild intermittent asthma without complication  -     Fluticasone-Umeclidin-Vilant (Trelegy Ellipta) 100-62.5-25 MCG/ACT inhaler; Inhale 1 puff Daily. EXP 2026-APR  LOT [id number]  Dispense: 3 each; Refill: 0  -     albuterol sulfate  (90 Base) MCG/ACT inhaler; Inhale 2 puffs Every 6 (Six) Hours As Needed for Wheezing or Shortness of Air.  Dispense: 18 g; Refill: 5    6. Obstructive sleep apnea syndrome  Assessment & Plan:  Patient states he cannot afford to CPAP machine.  Educated and counseled patient on the link between hypertension and sleep apnea.  Treatment plan discussed with patient, risks and complications resulting form not complying with the treatment plan discussed with patient.  Patient voiced understanding and accepts the risks of not following medical management instructions and recommendations.        Patient states he cannot afford Trelegy inhaler provided patient with samples today.    All chronic conditions have been addressed and treated by the practice or other specialists. Medications have been reconciled and refilled as appropriate. Reiterated compliance and timely follow up appointments. Side effects of all new and old medications reviewed with the patient and patient willing to accept all risks involved. Advised RTO if no improvement or worsening of symptoms or if any new complaints arise. Patient advised to follow up with clinic or call after diagnostic tests, if patient does not hear from office 3 days after the test completion.          Follow Up     Return in about 6 months (around 5/11/2025) for Next scheduled follow up, Annual physical.  Patient was given instructions and counseling regarding his condition or for health maintenance advice. Please see  specific information pulled into the AVS if appropriate.

## 2024-11-11 NOTE — ASSESSMENT & PLAN NOTE
Patient refused physical therapy, x-ray earlier in the other referrals today.  Counseled patient on avoiding repetitive bending, lifting, prolonged standing or walking.  Recommend alternating activities and avoiding activities that worsen the low back pain.

## 2025-02-25 ENCOUNTER — TELEPHONE (OUTPATIENT)
Dept: FAMILY MEDICINE CLINIC | Facility: CLINIC | Age: 65
End: 2025-02-25

## 2025-02-25 NOTE — TELEPHONE ENCOUNTER
PATIENT CALLED AND WANTED TO KNOW IF HE HAD AN APPOINTMENT FROM HIS LAST APPOINTMENT IN NOVEMBER TO COME IN MAY.  HE THOUGHT HE WAS TOLD BY [facility] TO COME BACK IN ABOUT 5-6- MONTHS. HE THOUGHT [facility] MADE THE APPOINTMENT NO APPOINTMENT SCHEDULED.  HE DIDN'T WANT TO MISS THE APPOINTMENT IF SCHEDULED. ADVISED I COULD MAKE THE APPOINTMENT. HE DECLINED    PLEASE CALL AND ADVISE [phone]

## 2025-05-19 RX ORDER — SILDENAFIL 100 MG/1
100 TABLET, FILM COATED ORAL DAILY PRN
Qty: 10 TABLET | Refills: 3 | Status: SHIPPED | OUTPATIENT
Start: 2025-05-19

## 2025-05-30 ENCOUNTER — OFFICE VISIT (OUTPATIENT)
Dept: FAMILY MEDICINE CLINIC | Facility: CLINIC | Age: 65
End: 2025-05-30
Payer: COMMERCIAL

## 2025-05-30 VITALS
SYSTOLIC BLOOD PRESSURE: 140 MMHG | OXYGEN SATURATION: 95 % | HEART RATE: 75 BPM | BODY MASS INDEX: 32.52 KG/M2 | DIASTOLIC BLOOD PRESSURE: 68 MMHG | TEMPERATURE: 98.7 F | WEIGHT: 207.2 LBS | HEIGHT: 67 IN

## 2025-05-30 DIAGNOSIS — I10 PRIMARY HYPERTENSION: Chronic | ICD-10-CM

## 2025-05-30 DIAGNOSIS — Z13.220 LIPID SCREENING: ICD-10-CM

## 2025-05-30 DIAGNOSIS — G89.29 CHRONIC LEFT-SIDED LOW BACK PAIN WITH LEFT-SIDED SCIATICA: Chronic | ICD-10-CM

## 2025-05-30 DIAGNOSIS — G47.33 OBSTRUCTIVE SLEEP APNEA SYNDROME: ICD-10-CM

## 2025-05-30 DIAGNOSIS — M54.42 CHRONIC LEFT-SIDED LOW BACK PAIN WITH LEFT-SIDED SCIATICA: Chronic | ICD-10-CM

## 2025-05-30 DIAGNOSIS — M10.00 IDIOPATHIC GOUT, UNSPECIFIED CHRONICITY, UNSPECIFIED SITE: Chronic | ICD-10-CM

## 2025-05-30 DIAGNOSIS — Z12.5 PROSTATE CANCER SCREENING: ICD-10-CM

## 2025-05-30 DIAGNOSIS — Z00.00 ANNUAL PHYSICAL EXAM: Primary | ICD-10-CM

## 2025-05-30 DIAGNOSIS — Z72.0 TOBACCO USE: ICD-10-CM

## 2025-05-30 DIAGNOSIS — J45.20 MILD INTERMITTENT ASTHMA WITHOUT COMPLICATION: Chronic | ICD-10-CM

## 2025-05-30 RX ORDER — HYDRALAZINE HYDROCHLORIDE 25 MG/1
25 TABLET, FILM COATED ORAL 3 TIMES DAILY
Qty: 270 TABLET | Refills: 2 | Status: SHIPPED | OUTPATIENT
Start: 2025-05-30

## 2025-05-30 RX ORDER — INDOMETHACIN 75 MG/1
75 CAPSULE, EXTENDED RELEASE ORAL 2 TIMES DAILY WITH MEALS
Qty: 20 CAPSULE | Refills: 5 | Status: SHIPPED | OUTPATIENT
Start: 2025-05-30

## 2025-05-30 RX ORDER — ALBUTEROL SULFATE 90 UG/1
2 INHALANT RESPIRATORY (INHALATION) EVERY 6 HOURS PRN
Qty: 18 G | Refills: 5 | Status: SHIPPED | OUTPATIENT
Start: 2025-05-30

## 2025-05-30 RX ORDER — HYDROCODONE BITARTRATE AND ACETAMINOPHEN 10; 325 MG/1; MG/1
1 TABLET ORAL EVERY 8 HOURS PRN
Qty: 30 TABLET | Refills: 0 | OUTPATIENT
Start: 2025-05-30

## 2025-05-30 RX ORDER — VERAPAMIL HYDROCHLORIDE 240 MG/1
240 TABLET, FILM COATED, EXTENDED RELEASE ORAL NIGHTLY
Qty: 90 TABLET | Refills: 2 | Status: SHIPPED | OUTPATIENT
Start: 2025-05-30

## 2025-05-30 RX ORDER — HYDROCHLOROTHIAZIDE 12.5 MG/1
12.5 TABLET ORAL EVERY MORNING
Qty: 90 TABLET | Refills: 2 | Status: SHIPPED | OUTPATIENT
Start: 2025-05-30

## 2025-05-30 RX ORDER — NAPROXEN 500 MG/1
500 TABLET ORAL 2 TIMES DAILY WITH MEALS
COMMUNITY
Start: 2025-01-16

## 2025-05-30 RX ORDER — FLUTICASONE FUROATE, UMECLIDINIUM BROMIDE AND VILANTEROL TRIFENATATE 100; 62.5; 25 UG/1; UG/1; UG/1
1 POWDER RESPIRATORY (INHALATION)
Qty: 3 EACH | Refills: 3 | COMMUNITY
Start: 2025-05-30

## 2025-05-30 RX ORDER — CLONIDINE HYDROCHLORIDE 0.1 MG/1
0.1 TABLET ORAL 2 TIMES DAILY
Qty: 180 TABLET | Refills: 2 | Status: SHIPPED | OUTPATIENT
Start: 2025-05-30

## 2025-05-30 NOTE — TELEPHONE ENCOUNTER
"Patient called.  answered. I got on phone and told the patient that machelle as already said no. We went around and around and then patient hung up on me    11:30am- called patient back to tell him after speaking w/ machelle. That it is a no and will have to talk to pain management and before finishing the words, patient said \"well\" *click* hung up on me again.   "

## 2025-05-30 NOTE — PROGRESS NOTES
"Chief Complaint  Annual Exam (DUE- Pneumococcal Vaccine /Med refill, BP, gap. )    Subjective        Neftali Rogers presents to [facility]  History of Present Illness  64-year-old -American male here for chronic disease management follow-up visit and annual physical.  Patient reports he has been doing well in general.    Patient reports he is current insurance is through his job and he has to pay his premium.  Patient concerned about high co-pays and medical bills with his insurance and would like to limit testing & referrals..      Instructed patient to get the adult preventive immunization that are due at  the pharmacy, including - pneumonia, Tdap .    Patient denied any Chest pain, Shortness of Air, palpitations, Dizziness, Headache, Blurred vision, or weakness/numbness.   Patient reports asthma is under control.        Objective   Vital Signs:  /68 (BP Location: Left arm, Patient Position: Sitting, Cuff Size: Adult)   Pulse 75   Temp 98.7 °F (37.1 °C)   Ht 170.2 cm (67.01\")   Wt 94 kg (207 lb 3.2 oz)   SpO2 95%   BMI 32.44 kg/m²   Estimated body mass index is 32.44 kg/m² as calculated from the following:    Height as of this encounter: 170.2 cm (67.01\").    Weight as of this encounter: 94 kg (207 lb 3.2 oz).               Physical Exam  Constitutional:       Appearance: Normal appearance.   HENT:      Head: Normocephalic and atraumatic.   Eyes:      Conjunctiva/sclera: Conjunctivae normal.   Cardiovascular:      Rate and Rhythm: Normal rate and regular rhythm.      Heart sounds: Normal heart sounds.   Pulmonary:      Effort: Pulmonary effort is normal.      Breath sounds: Normal breath sounds.   Abdominal:      General: Bowel sounds are normal.      Palpations: Abdomen is soft.      Comments: Non-tender   Skin:     General: Skin is warm.   Neurological:      General: No focal deficit present.      Mental Status: He is alert and oriented to person, place, " and time.   Psychiatric:         Mood and Affect: Mood normal.         Behavior: Behavior normal.        Result Review :    The following data was reviewed by: FERDINAND Rosa on 05/30/2025:                             Assessment and Plan     Diagnoses and all orders for this visit:    1. Annual physical exam (Primary)  Assessment & Plan:  Counseling was provided on nutrition, physical activity, development, and injury prevention, dental health, and safe sex practices patient verbalizes understanding no additional questions were asked.           2. Primary hypertension  Assessment & Plan:  Hypertension is stable and controlled  Continue current treatment regimen.  Dietary sodium restriction.  Weight loss.  Regular aerobic exercise.  Ambulatory blood pressure monitoring.  Blood pressure will be reassessed in 6 months.  Discussed the importance of healthy diet, nutrition, and lifestyle. Recommend low salt, fat/cholesterol diet and avoid concentrated sweets. Encouraged DASH diet along with fresh fruits & vegetables and low fat dairy products. Counseled patient to exercise/walk as tolerated. Avoid tobacco and alcohol use.      Orders:  -     CBC & Differential; Future  -     Comprehensive Metabolic Panel; Future  -     Urinalysis With Microscopic - Urine, Clean Catch; Future  -     TSH Rfx On Abnormal To Free T4; Future  -     verapamil SR (CALAN-SR) 240 MG CR tablet; Take 1 tablet by mouth Every Night.  Dispense: 90 tablet; Refill: 2  -     cloNIDine (CATAPRES) 0.1 MG tablet; Take 1 tablet by mouth 2 (Two) Times a Day.  Dispense: 180 tablet; Refill: 2  -     hydrALAZINE (APRESOLINE) 25 MG tablet; Take 1 tablet by mouth 3 (Three) Times a Day.  Dispense: 270 tablet; Refill: 2  -     hydroCHLOROthiazide 12.5 MG oral; Take 1 each by mouth Every Morning.  Dispense: 90 tablet; Refill: 2    3. Idiopathic gout, unspecified chronicity, unspecified site  Assessment & Plan:  Stable and under control.    Orders:  -      indomethacin SR (INDOCIN SR) 75 MG CR capsule; Take 1 capsule by mouth 2 (Two) Times a Day With Meals.  Dispense: 20 capsule; Refill: 5  -     Uric acid; Future    4. Obstructive sleep apnea syndrome  Assessment & Plan:  stable      5. Tobacco use  Assessment & Plan:  Discussed Tobacco cessation with patient. Patient counseled to gradually cut back on Tobacco use gradually, on a weekly basis. Patient to follow-up with clinic once ready to quit completely for adjunct therapy for Tobacco cessation. Also counseled patient to avoid second-hand Tobacco smoke, patient voiced understanding.        6. Chronic left-sided low back pain with left-sided sciatica  Assessment & Plan:  Stable.  Patient has a history of abnormal urine drug screen in the past.  Will be cautious with narcotic prescription request.  I have offered patient alternative to narcotic use including NSAIDs such as meloxicam, muscle relaxant such as baclofen, lidocaine patches and other alternatives.  I have also discussed pain management referral in the past.      7. Lipid screening  -     Lipid Panel; Future    8. Prostate cancer screening  -     PSA Screen; Future    9. Mild intermittent asthma without complication  Assessment & Plan:  Stable and under control.          Orders:  -     albuterol sulfate  (90 Base) MCG/ACT inhaler; Inhale 2 puffs Every 6 (Six) Hours As Needed for Wheezing or Shortness of Air.  Dispense: 18 g; Refill: 5  -     Fluticasone-Umeclidin-Vilant (Trelegy Ellipta) 100-62.5-25 MCG/ACT inhaler; Inhale 1 puff Daily. Please rinse mouth with water after each use.  Dispense: 3 each; Refill: 3        All chronic conditions have been addressed and treated by the practice or other specialists. Medications have been reconciled and refilled as appropriate. Reiterated compliance and timely follow up appointments. Side effects of all new and old medications reviewed with the patient and patient willing to accept all risks involved.  Advised RTO if no improvement or worsening of symptoms or if any new complaints arise. Patient advised to follow up with clinic or call after diagnostic tests, if patient does not hear from office 3 days after the test completion.          Follow Up     Return in about 6 months (around 11/30/2025) for Next scheduled follow up.  Patient was given instructions and counseling regarding his condition or for health maintenance advice. Please see specific information pulled into the AVS if appropriate.

## 2025-05-30 NOTE — ASSESSMENT & PLAN NOTE
Stable.  Patient has a history of abnormal urine drug screen in the past.  Will be cautious with narcotic prescription request.  I have offered patient alternative to narcotic use including NSAIDs such as meloxicam, muscle relaxant such as baclofen, lidocaine patches and other alternatives.  I have also discussed pain management referral in the past.

## 2025-05-30 NOTE — TELEPHONE ENCOUNTER
Caller: Neftali Rogers [place]    Relationship: Self    Best call back number: [phone]     Requested Prescriptions:   Requested Prescriptions     Pending Prescriptions Disp Refills    HYDROcodone-acetaminophen (NORCO)  MG per tablet 30 tablet 0     Sig: Take 1 tablet by mouth Every 8 (Eight) Hours As Needed for Moderate Pain (low back pain).        Pharmacy where request should be sent: [facility] [id number] AT [facility] ([facility] )  [phone] [facility] [phone] FX     Last office visit with prescribing clinician: 5/30/2025   Last telemedicine visit with prescribing clinician: Visit date not found   Next office visit with prescribing clinician: 12/1/2025     Additional details provided by patient:     Does the patient have less than a 3 day supply:  [x] Yes  [] No    Would you like a call back once the refill request has been completed: [] Yes [x] No    If the office needs to give you a call back, can they leave a voicemail: [x] Yes [] No    Win Corea Rep   05/30/25 09:37 EDT

## 2025-05-30 NOTE — ASSESSMENT & PLAN NOTE
Hypertension is stable and controlled  Continue current treatment regimen.  Dietary sodium restriction.  Weight loss.  Regular aerobic exercise.  Ambulatory blood pressure monitoring.  Blood pressure will be reassessed in 6 months.  Discussed the importance of healthy diet, nutrition, and lifestyle. Recommend low salt, fat/cholesterol diet and avoid concentrated sweets. Encouraged DASH diet along with fresh fruits & vegetables and low fat dairy products. Counseled patient to exercise/walk as tolerated. Avoid tobacco and alcohol use.